# Patient Record
Sex: MALE | URBAN - METROPOLITAN AREA
[De-identification: names, ages, dates, MRNs, and addresses within clinical notes are randomized per-mention and may not be internally consistent; named-entity substitution may affect disease eponyms.]

---

## 2024-09-03 ENCOUNTER — TRANSCRIBE ORDERS (OUTPATIENT)
Dept: PSYCHIATRY | Facility: CLINIC | Age: 59
End: 2024-09-03

## 2024-09-03 DIAGNOSIS — F32.9 TREATMENT-RESISTANT DEPRESSION: Primary | ICD-10-CM

## 2024-09-12 ENCOUNTER — TRANSCRIBE ORDERS (OUTPATIENT)
Dept: PSYCHIATRY | Facility: CLINIC | Age: 59
End: 2024-09-12

## 2024-09-12 DIAGNOSIS — F33.1 MAJOR DEPRESSIVE DISORDER, RECURRENT EPISODE, MODERATE: Primary | ICD-10-CM
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
## 2024-10-21 ENCOUNTER — OFFICE VISIT (OUTPATIENT)
Dept: PSYCHIATRY | Facility: CLINIC | Age: 59
End: 2024-10-21
Payer: OTHER GOVERNMENT

## 2024-10-21 VITALS
HEART RATE: 79 BPM | WEIGHT: 182.6 LBS | OXYGEN SATURATION: 98 % | DIASTOLIC BLOOD PRESSURE: 63 MMHG | HEIGHT: 69 IN | BODY MASS INDEX: 27.05 KG/M2 | SYSTOLIC BLOOD PRESSURE: 92 MMHG

## 2024-10-21 DIAGNOSIS — Z79.899 ENCOUNTER FOR LONG-TERM (CURRENT) USE OF MEDICATIONS: ICD-10-CM

## 2024-10-21 DIAGNOSIS — F33.2 SEVERE EPISODE OF RECURRENT MAJOR DEPRESSIVE DISORDER, WITHOUT PSYCHOTIC FEATURES: Primary | Chronic | ICD-10-CM

## 2024-10-21 PROCEDURE — 90792 PSYCH DIAG EVAL W/MED SRVCS: CPT

## 2024-10-21 RX ORDER — SUMATRIPTAN 100 MG/1
100 TABLET, FILM COATED ORAL
COMMUNITY

## 2024-10-21 RX ORDER — BUSPIRONE HYDROCHLORIDE 7.5 MG/1
7.5 TABLET ORAL 3 TIMES DAILY
COMMUNITY

## 2024-10-21 RX ORDER — METOPROLOL TARTRATE 25 MG/1
25 TABLET, FILM COATED ORAL 2 TIMES DAILY
COMMUNITY

## 2024-10-21 RX ORDER — FLUNISOLIDE 0.25 MG/ML
2 SOLUTION NASAL EVERY 12 HOURS
COMMUNITY

## 2024-10-21 RX ORDER — ASPIRIN 81 MG/1
81 TABLET ORAL DAILY
COMMUNITY

## 2024-10-21 RX ORDER — SPIRONOLACTONE 25 MG/1
25 TABLET ORAL DAILY
COMMUNITY

## 2024-10-21 RX ORDER — BICTEGRAVIR SODIUM, EMTRICITABINE, AND TENOFOVIR ALAFENAMIDE FUMARATE 50; 200; 25 MG/1; MG/1; MG/1
TABLET ORAL DAILY
COMMUNITY

## 2024-10-21 RX ORDER — VENLAFAXINE 75 MG/1
225 TABLET ORAL DAILY
COMMUNITY

## 2024-10-21 RX ORDER — LORATADINE 10 MG/1
CAPSULE, LIQUID FILLED ORAL
COMMUNITY

## 2024-10-21 RX ORDER — POLYETHYLENE GLYCOL 3350 17 G
2 POWDER IN PACKET (EA) ORAL AS NEEDED
COMMUNITY

## 2024-10-21 RX ORDER — CHOLECALCIFEROL (VITAMIN D3) 10(400)/ML
DROPS ORAL
COMMUNITY

## 2024-10-21 RX ORDER — ALBUTEROL SULFATE 90 UG/1
2 INHALANT RESPIRATORY (INHALATION) EVERY 6 HOURS PRN
COMMUNITY

## 2024-10-21 RX ORDER — UREA 10 %
1000 LOTION (ML) TOPICAL DAILY
COMMUNITY

## 2024-10-21 RX ORDER — ROSUVASTATIN CALCIUM 20 MG/1
20 TABLET, COATED ORAL DAILY
COMMUNITY

## 2024-10-21 NOTE — PATIENT INSTRUCTIONS
Suicidal Feelings: How to Help Yourself  Suicide is when you end your own life. Suicidal ideation includes expressing thoughts about, or a preoccupation with, ending your own life. There are many things you can do to help yourself feel better when struggling with these feelings. Many services and people are available to support you and others who struggle with similar feelings.  If you ever feel like you may hurt yourself or others, or have thoughts about taking your own life, get help right away. To get help:  Go to your nearest emergency department.  Call your local emergency services (771 in the [place]  Call the [facility] and [facility] services helpline (211 in the [place]  Call or text a suicide hotline to speak with a trained counselor. The following suicide hotlines are available in the [place]:  5-354-741-TALK (1-829.900.8817 or 386 in the [place]  9-544-FGNQJRI (1-532.678.7785).  Text 928335. This is the Crisis Text Line in the [place]  1-535.952.6648. This is a hotline for Maltese speakers.  1-826.382.3515. This is a hotline for TTY users.  4-875-1-U-SYLVAIN (1-633.785.6447). This is a hotline for lesbian, lanier, bisexual, transgender, or questioning youth.  For a list of hotlines in [place], visit suicide.org/hotlines/international/jkxwzx-xmyortj-udwqogte.html  Contact a crisis center or a local suicide prevention center. To find a crisis center or suicide prevention center:  Call your local hospital, clinic, community service organization, mental health center, social service provider, or health department. Ask for help with connecting to a crisis center.  For a list of crisis centers in the [place], visit: suicidepreventionlifeline.org  For a list of crisis centers in [place], visit: suicideprevention.ca  How to help yourself feel better    Promise yourself that you will not do anything bad or extreme when you have suicidal feelings. Remember the times you have felt hopeful.  Many people have  gotten through suicidal thoughts and feelings, and you can too.  If you have had these feelings before, remind yourself that you can get through them again.  Let family, friends, teachers, or counselors know how you are feeling. Do not separate yourself from those who care about you and want to help you.  Talk with someone every day, even if you do not feel like talking to anyone or being with other people.  Face-to-face conversation is best to help them understand your feelings.  Contact a mental health care provider and work with this person regularly.  Make a safety plan that you can follow during a crisis.  Include phone numbers of suicide prevention hotlines, mental health professionals, and trusted friends and family members you can call during an emergency.  Save these numbers on your phone.  If you are thinking of taking a lot of medicine, give your medicine to someone who can give it to you as prescribed.  If you are on antidepressants and are concerned you will overdose, tell your health care provider so that he or she can give you safer medicines.  Try to stick to your routines and follow a schedule every day. Make self-care a priority.  Make a list of realistic goals, and cross them off when you achieve them. Accomplishments can give you a sense of worth.  Wait until you are feeling better before doing things that you find difficult or unpleasant.  Do things that you have always enjoyed to take your mind off your feelings.  Try reading a book, or listening to or playing music.  Spending time outside, in nature, may help you feel better.  Follow these instructions at home:    Visit your primary health care provider every year for a physical and a mental health checkup.  Take over-the-counter and prescription medicines only as told by your health care provider.  Ask your health care provider about the possible side effects of any medicines you are taking.  Ask your health care provider about whether  suicidal ideation is a possible side effect of any of your medicines.  Learn about suicidal ideation and what increases the risk for the development of suicidal thoughts.  Eat a well-balanced diet, and eat regular meals.  Get plenty of rest.  Exercise if you are able. Just 30 minutes of exercise each day can help you feel better.  Keep your living space well lit.  Do not use alcohol or drugs. Remove these substances from your home.  General recommendations  Remove weapons, poisons, knives, and other deadly items from your home.  Work with a mental health care provider as needed.  When you are feeling well, write yourself a letter with tips and support that you can read when you are not feeling well.  Remember that life's difficulties can be sorted out with help. Conditions can be treated, and you can learn behaviors and ways of thinking that will help you.  Work with your health care provider or counselor to learn ways of coping with your thoughts and feelings.  Where to find more information  National Suicide Prevention Lifeline: www.suicidepreventionlifeline.org  Hopeline: www.hopeline.[facility]  American Foundation for Suicide Prevention: www.afsp.org  The [place]ya Project (for lesbian, lanier, bisexual, transgender, or questioning youth): www.thetrevorproject.org  National [place] of Mental Health: www.nimh.nih.gov/health/topics/suicide-prevention  Suicide Prevention Resources: afsp.org/suicide-prevention-resources  Contact a health care provider if:  You feel as though you are a burden to others.  You feel agitated, angry, vengeful, or have extreme mood swings.  You have withdrawn from family and friends.  You are frequently using drugs or alcohol.  Get help right away if:  You are talking about suicide or wishing to die.  You start making plans for how to commit suicide.  You feel that you have no reason to live.  You start making plans for putting your affairs in order, saying goodbye, or giving your possessions  away.  You feel guilt, shame, or unbearable pain, and it seems like there is no way out.  You are engaging in risky behaviors that could lead to death.  If you have any of these thoughts or symptoms, get help right away:  Go to your nearest emergency department or crisis center.  Call emergency services (911 in the [place]  Call or text a suicide crisis helpline.  Summary  Suicide is when you take your own life. Suicidal feelings are thoughts about ending your own life.  Promise yourself that you will not do anything bad or extreme when you have suicidal feelings.  Let family, friends, teachers, or counselors know how you are feeling.  Get help right away if you start making plans for how to commit suicide.  This information is not intended to replace advice given to you by your health care provider. Make sure you discuss any questions you have with your health care provider.  Document Revised: 07/14/2022 Document Reviewed: 04/28/2022  [employer] Patient Education © 2024 [employer]    Major Depressive Disorder, Adult  Major depressive disorder (MDD) is a mental health condition. It may also be called clinical depression or unipolar depression. MDD causes symptoms of sadness, hopelessness, and loss of interest in things. These symptoms last most of the day, almost every day, for 2 weeks. MDD can also cause physical symptoms. It can interfere with relationships and activities, such as work, school, and activities that are usually pleasant.  MDD may be mild, moderate, or severe. It may be single-episode MDD, which happens once, or recurrent MDD, which may occur many times.  What are the causes?  The exact cause of this condition is not known.  What increases the risk?  The following factors may make someone more likely to develop MDD:  A family history of depression.  Being female.  Long-term (chronic) stress, physical illness, other mental health disorders, or substance misuse.  Trauma, including:  Family  problems.  Violence or abuse.  Loss of a parent or close family member.  Experiencing discrimination.  What are the signs or symptoms?  The main symptoms of MDD usually include:  Constant depressed or irritable mood.  A loss of interest in activities.  Sleeping or eating too much or too little.  Tiredness or low energy.  Other symptoms include:  Unexplained weight gain or weight loss.  Being agitated, restless, or weak.  Feeling hopeless, worthless, or guilty.  Trouble thinking clearly or making decisions.  Thoughts of suicide or harming others.  Spending a lot of time alone.  Not being able to complete daily tasks or work.  Severe symptoms of this condition may include:  Psychotic depression.This may include false beliefs or delusions. It may also include seeing, hearing, tasting, smelling, or feeling things that are not real (hallucinations).  Chronic depression or persistent depressive disorder. This is low-level depression that lasts for at least 2 years.  Melancholic depression, or feeling extremely sad and hopeless.  Catatonic depression, which includes trouble speaking and trouble moving.  Seasonal depression, which is caused by changes in the seasons.  How is this diagnosed?  This condition may be diagnosed based on:  Your symptoms.  Your medical and mental health history.  A physical exam.  Blood tests to rule out other conditions.  MDD is confirmed if you have either a depressed mood or loss of interest and at least four other MDD symptoms, most of the day, nearly every day, in a 2-week period.  How is this treated?  This condition is usually treated by mental health professionals, such as psychologists, psychiatrists, and clinical social workers. You may need more than one type of treatment. Treatment may include:  Psychotherapy, also called talk therapy or counseling. Types of psychotherapy include:  Cognitive behavioral therapy (CBT). This teaches you to recognize unhealthy feelings, thoughts, and  behaviors, and replace them with positive thoughts and actions.  Interpersonal therapy (IPT). This helps you to improve the way you communicate with others or relate to them.  Family therapy. This treatment includes members of your family.  Medicines to treat anxiety and depression. These medicines help to balance the brain chemicals that affect your emotions.  Lifestyle changes. You may be asked to:  Limit alcohol use and avoid drug use.  Get regular exercise.  Get plenty of sleep.  Make healthy eating choices.  Spend more time outdoors.  Brain stimulation. This may be done if symptoms are very severe and other treatments have not worked. Examples of this treatment are electroconvulsive therapy and transcranial magnetic stimulation.  Follow these instructions at home:  Alcohol use  Do not drink alcohol if:  Your health care provider tells you not to drink.  You are pregnant, may be pregnant, or are planning to become pregnant.  If you drink alcohol:  Limit how much you have to:  0-1 drink a day for women  0-2 drinks a day for men.  Know how much alcohol is in your drink. In the [place] one drink equals one 12 oz bottle of beer (355 mL), one 5 oz glass of wine (148 mL), or one 1½ oz glass of hard liquor (44 mL).  Activity  Exercise regularly and spend time outdoors.  Find activities that you enjoy and make time to do them.  Find healthy ways to manage stress, such as:  Meditation or deep breathing.  Spending time in nature.  Journaling.  Return to your normal activities as told by your health care provider. Ask your health care provider what activities are safe for you.  General instructions    Take over-the-counter and prescription medicines only as told by your health care provider.  Discuss alcohol use with your health care provider. Alcohol can affect any antidepressant medicines you are taking.  Discuss any drug use with your health care provider.  Eat a healthy diet and get enough sleep.  Consider joining a  support group. Your health care provider may be able to recommend one.  Keep all follow-up visits. It is important for your health care provider to check on your mood, behavior, and medicines. Your health care provider will make changes to your treatment as needed.  Where to find more information  National [place] on Mental Illness: doris.org  National [place] of Mental Health: nimh.nih.gov  American Psychiatric Association: psychiatry.org  Contact a health care provider if:  Your symptoms get worse.  You develop new symptoms.  Get help right away if:  You hurt yourself on purpose (self-harm).  You have thoughts about hurting yourself or others.  You have hallucinations.  Get help right away if you feel like you may hurt yourself or others, or have thoughts about taking your own life. Go to your nearest emergency room or:  Call 911.  Call the National Suicide Prevention Lifeline at 1-652.161.2367 or 838. This is open 24 hours a day.  Text the Crisis Text Line at 734272.  This information is not intended to replace advice given to you by your health care provider. Make sure you discuss any questions you have with your health care provider.  Document Revised: 04/25/2023 Document Reviewed: 04/25/2023  [employer]vier Patient Education © 2024 [employer]

## 2024-10-23 ENCOUNTER — PATIENT ROUNDING (BHMG ONLY) (OUTPATIENT)
Dept: PSYCHIATRY | Facility: CLINIC | Age: 59
End: 2024-10-23
Payer: OTHER GOVERNMENT

## 2024-10-23 NOTE — PROGRESS NOTES
A My-chart message has been sent to the patient for PATIENT ROUNDING with [facility].

## 2024-10-24 LAB
1OH-MIDAZOLAM UR QL SCN: NOT DETECTED NG/MG CREAT
6MAM UR QL SCN: NEGATIVE NG/ML
7AMINOCLONAZEPAM/CREAT UR: NOT DETECTED NG/MG CREAT
8OH-AMOXAPINE UR QL: NOT DETECTED
8OH-LOXAPINE UR QL SCN: NOT DETECTED
A-OH ALPRAZ/CREAT UR: NOT DETECTED NG/MG CREAT
A-OH-TRIAZOLAM/CREAT UR CFM: NOT DETECTED NG/MG CREAT
ALPRAZ/CREAT UR CFM: NOT DETECTED NG/MG CREAT
AMITRIP UR-MCNC: NOT DETECTED NG/ML
AMOXAPINE UR QL: NOT DETECTED
AMPHET UR CFM-MCNC: NOT DETECTED NG/MG CREAT
AMPHETAMINES UR QL SCN>500 NG/ML: NORMAL NG/ML
AMPHETAMINES UR QL: NEGATIVE
ANTICONVULSANTS UR: NEGATIVE
ANTIPSYCHOTICS UR: NEGATIVE
ARIPIPRAZOLE UR QL SCN: NOT DETECTED
ASENAPINE UR QL CFM: NOT DETECTED
BARBITURATES UR QL SCN: NEGATIVE NG/ML
BENZODIAZ SCN METH UR: NEGATIVE
BUPRENORPHINE UR QL SCN: NEGATIVE NG/ML
BUPROPION UR QL: NOT DETECTED
CANNABINOIDS UR QL CFM: NORMAL
CANNABINOIDS UR QL SCN: NORMAL NG/ML
CARBOXYTHC UR CFM-MCNC: 95 NG/MG CREAT
CARISOPRODOL UR QL: NEGATIVE NG/ML
CHLORPROMAZINE UR QL: NOT DETECTED
CITALOPRAM, UR: NOT DETECTED
CLOMIPRAMINE UR-MCNC: NOT DETECTED NG/ML
CLONAZEPAM/CREAT UR CFM: NOT DETECTED NG/MG CREAT
CLOZAPINE UR QL: NOT DETECTED
COCAINE+BZE UR QL SCN: NEGATIVE NG/ML
CREAT UR-MCNC: 109 MG/DL
DESALKYLFLURAZ/CREAT UR: NOT DETECTED NG/MG CREAT
DESIPRAMINE UR-MCNC: NOT DETECTED NG/ML
DIAZEPAM/CREAT UR: NOT DETECTED NG/MG CREAT
DOXEPIN UR-MCNC: NOT DETECTED NG/ML
DULOXETINE UR QL: NOT DETECTED
ETHANOL UR QL SCN: NEGATIVE NG/ML
FENTANYL UR QL SCN: NEGATIVE NG/ML
FLUNITRAZEPAM UR QL SCN: NOT DETECTED NG/MG CREAT
FLUOXETINE UR QL SCN: NOT DETECTED
FLUPHENAZINE UR-MCNC: NOT DETECTED NG/ML
FLUVOXAMINE UR QL: NOT DETECTED
GABAPENTIN UR-MCNC: NEGATIVE UG/ML
HALOPERIDOL UR QL: NOT DETECTED
ILOPERIDONE UR QL CFM: NOT DETECTED
IMIPRAMINE UR-MCNC: NOT DETECTED NG/ML
KRATOM IA, UR: NEGATIVE NG/ML
LORAZEPAM/CREAT UR: NOT DETECTED NG/MG CREAT
LOXAPINE UR QL: NOT DETECTED
LURASIDONE UR QL CFM: NOT DETECTED
MAPROTILINE UR QL: NOT DETECTED
MDA UR CFM-MCNC: NOT DETECTED NG/MG CREAT
MDMA UR CFM-MCNC: NOT DETECTED NG/MG CREAT
ME-PHENIDATE UR QL SCN: NEGATIVE NG/ML
MESORIDAZINE UR QL: NOT DETECTED
METHADONE UR QL SCN: NEGATIVE NG/ML
METHADONE+METAB UR QL SCN: NEGATIVE NG/ML
METHAMPHET UR CFM-MCNC: NOT DETECTED NG/MG CREAT
MIDAZOLAM/CREAT UR CFM: NOT DETECTED NG/MG CREAT
MIRTAZAPINE UR-MCNC: NOT DETECTED UG/ML
MOLINDONE UR QL SCN: NOT DETECTED
NEFAZODONE UR QL: NOT DETECTED
NORCITALOPRAM UR QL: NOT DETECTED
NORCLOMIPRAMINE UR QL: NOT DETECTED
NORCLOZAPINE UR QL: NOT DETECTED
NORDIAZEPAM/CREAT UR: NOT DETECTED NG/MG CREAT
NORDOXEPIN UR QL: NOT DETECTED
NORFLUNITRAZEPAM UR-MCNC: NOT DETECTED NG/MG CREAT
NORFLUOXETINE UR-MCNC: NOT DETECTED NG/ML
NORSERTRALINE UR QL: NOT DETECTED
NORTRIP UR-MCNC: NOT DETECTED NG/ML
ODV UR-MCNC: PRESENT NG/ML
OH-BUPROPION UR-MCNC: NOT DETECTED NG/ML
OLANZAPINE UR CFM-MCNC: NOT DETECTED NG/ML
OPIATES UR SCN-MCNC: NEGATIVE NG/ML
OXAZEPAM/CREAT UR: NOT DETECTED NG/MG CREAT
OXYCODONE CTO UR SCN-MCNC: NEGATIVE NG/ML
PAROXETINE UR-MCNC: NOT DETECTED NG/L
PCP UR QL SCN: NEGATIVE NG/ML
PERPHENAZINE UR QL: NOT DETECTED
PIMOZIDE, UR: NOT DETECTED
PREGABALIN UR QL SCN: NOT DETECTED
PRESCRIBED MEDICATIONS: NORMAL
PROCHLORPERAZINE UR QL: NOT DETECTED
PROPOXYPH UR QL SCN: NEGATIVE NG/ML
PROTRIP UR QL: NOT DETECTED
QUETIAPINE CTO UR CFM-MCNC: NOT DETECTED NG/ML
RISPERIDONE UR QL: NOT DETECTED
SERTRALINE UR-MCNC: NOT DETECTED NG/ML
TAPENTADOL CTO UR SCN-MCNC: NEGATIVE NG/ML
TEMAZEPAM/CREAT UR: NOT DETECTED NG/MG CREAT
THIORIDAZINE UR-MCNC: NOT DETECTED UG/ML
THIOTHIXENE UR QL: NOT DETECTED
TRAMADOL UR QL SCN: NEGATIVE NG/ML
TRAZODONE UR QL: PRESENT
TRAZODONE UR-MCNC: PRESENT UG/ML
TRICYCLICS TESTED UR SCN: NORMAL
TRIFPERAZINE UR QL: NOT DETECTED
TRIMIPRAMINE UR QL: NOT DETECTED
VENLAFAXINE UR QL: PRESENT
VILAZ UR QL SCN: NOT DETECTED
ZIPRASIDONE UR QL SCN: NOT DETECTED

## 2024-10-29 ENCOUNTER — TELEPHONE (OUTPATIENT)
Dept: PSYCHIATRY | Facility: CLINIC | Age: 59
End: 2024-10-29
Payer: OTHER GOVERNMENT

## 2024-10-29 NOTE — TELEPHONE ENCOUNTER
The patient has [facility] from [facility] for visits and treatments. He will be a buy and bill, the medication will come from our distributor.  We will need to use CPT code  for Spravato 56 mg and  for Spravato 84 mg. We can schedule. we do have the medication on hand.  Please advise if we should schedule.

## 2024-10-31 ENCOUNTER — TELEPHONE (OUTPATIENT)
Dept: PSYCHIATRY | Facility: CLINIC | Age: 59
End: 2024-10-31
Payer: OTHER GOVERNMENT

## 2024-11-03 NOTE — PROGRESS NOTES
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     MEDS ORDERED DURING VISIT:  No orders of the defined types were placed in this encounter.      No follow-ups on file.         This document has been electronically signed by LOBO Vance  November 4, 2024 08:53 EST    Part of this note may be an electronic transcription/translation of spoken language to printed text using the Dragon Dictation System. Some of the data in this electronic note has been brought forward from a previous encounter, any necessary changes have been made, it has been reviewed by this APRN, and it is accurate.

## 2024-11-04 ENCOUNTER — OFFICE VISIT (OUTPATIENT)
Dept: PSYCHIATRY | Facility: CLINIC | Age: 59
End: 2024-11-04
Payer: OTHER GOVERNMENT

## 2024-11-04 DIAGNOSIS — F33.2 SEVERE EPISODE OF RECURRENT MAJOR DEPRESSIVE DISORDER, WITHOUT PSYCHOTIC FEATURES: Primary | Chronic | ICD-10-CM

## 2024-11-04 RX ORDER — BUPROPION HYDROCHLORIDE 150 MG/1
1 TABLET, FILM COATED, EXTENDED RELEASE ORAL DAILY
COMMUNITY

## 2024-11-04 NOTE — PROGRESS NOTES
Spravato Monitoring Note    Start time of observation: 809 am  BP check prior to administration: 121/70  O2-98    Rajendra Haydenreuben   presented 11/04/2024  for clinical monitoring of self administration of Spravato. The patient used a total of 2 devices, self-administered intranasally, with a 5-minute rest between each device.    Each device was checked by  for appropriate expiration and that 2 green indicator dots were present prior to patient administration. Afterwards, the device was checked by  to ensure the green indicator dots were gone and the full medication was delivered.    Patient check at 849 am with BP reading of 124/77  O2-99  Patient check at 1009 am with BP reading of 138/83  O2-98    End time of observation: 1009 am    Patient monitored for 2 hours Patient tolerated the procedure well without complications..       NDC [id number]  LOT [id number]  EXP 11/01/2026

## 2024-11-06 NOTE — PROGRESS NOTES
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 regimen & F/U appt's and deny impairment in daily functioning over the next 6 months.      CRISIS RESOURCES:    In the event you have personal crisis, there are several resources to reach someone to talk with:    988 Suicide and Crisis Lifeline  Call or text 988 or chat 988ESTmobline.org  [facility]'s National Helpline  8-545-035-HELP (4357)  Text your zip code to 643461 (HELP4U)  [facility] Crisis Line  Dial 988, then press 1  Text 641665    No show policy:  We understand unexpected circumstances arise; however, anytime you miss your appointment we are unable to provide you appropriate care.  In addition, each appointment missed could have been used to provide care for others.  We ask that you call at least 24 hours in advance to cancel or reschedule an appointment. We would like to take this opportunity to remind you of our policy stating patients who miss THREE appointments without cancelling or rescheduling 24 hours in advance of the appointment may be subject to cancellation of any further visits with our clinic. Please call  [phone] to reschedule your appointment. If there are reasons that make it difficult for you to keep the appointments, please call and let us know how we can help. Please understand that medication prescribing will not continue without seeing your provider.        MEDICATION ISSUES:  INSPECT reviewed as expected    Discussed medication options and treatment plan of prescribed medication as well as the risks, benefits, and side effects including potential falls, possible impaired driving and metabolic adversities among others. Patient is agreeable to call the office with any worsening of symptoms or onset of side effects. Patient is agreeable to call 911 or go to the nearest ER should he/she begin having SI/HI. No medication side effects or related complaints today.     MEDS ORDERED DURING VISIT:  No orders of the defined types were placed in this encounter.      No follow-ups on file.         This document has been electronically signed by LOBO Vance  November 7, 2024 10:01 EST    Part of this note may be an electronic transcription/translation of spoken language to printed text using the Dragon Dictation System. Some of the data in this electronic note has been brought forward from a previous encounter, any necessary changes have been made, it has been reviewed by this APRN, and it is accurate.

## 2024-11-07 ENCOUNTER — OFFICE VISIT (OUTPATIENT)
Dept: PSYCHIATRY | Facility: CLINIC | Age: 59
End: 2024-11-07
Payer: OTHER GOVERNMENT

## 2024-11-07 DIAGNOSIS — F33.2 SEVERE EPISODE OF RECURRENT MAJOR DEPRESSIVE DISORDER, WITHOUT PSYCHOTIC FEATURES: Primary | Chronic | ICD-10-CM

## 2024-11-07 NOTE — PROGRESS NOTES
Spravato Monitoring Note    Start time of observation: 802 am  BP check prior to administration: 106/69  O2-99    Rajendra Haydenreuben   presented 11/07/2024  for clinical monitoring of self administration of Spravato. The patient used a total of 2 devices, self-administered intranasally, with a 5-minute rest between each device.    Each device was checked by [facility] for appropriate expiration and that 2 green indicator dots were present prior to patient administration. Afterwards, the device was checked by [facility] to ensure the green indicator dots were gone and the full medication was delivered.    Patient check at 842 am with BP reading of 119/77  O2-99  Patient check at 1002 am with BP reading of 117/73  O2-99    End time of observation: 1002 am    Patient monitored for 2 hours Patient tolerated the procedure well without complications..       NDC 02575-890-28  LOT [id number]  EXP 11/01/2026

## 2024-11-12 ENCOUNTER — OFFICE VISIT (OUTPATIENT)
Dept: PSYCHIATRY | Facility: CLINIC | Age: 59
End: 2024-11-12
Payer: OTHER GOVERNMENT

## 2024-11-12 DIAGNOSIS — F33.2 SEVERE EPISODE OF RECURRENT MAJOR DEPRESSIVE DISORDER, WITHOUT PSYCHOTIC FEATURES: Primary | Chronic | ICD-10-CM

## 2024-11-12 NOTE — PROGRESS NOTES
Spravato Monitoring Note    Start time of observation: 804 am  BP check prior to administration: 107/71  O2-99    Rajendra Haydenreuben   presented 11/12/2024  for clinical monitoring of self administration of Spravato. The patient used a total of 3 devices, self-administered intranasally, with a 5-minute rest between each device.    Each device was checked by [facility] for appropriate expiration and that 2 green indicator dots were present prior to patient administration. Afterwards, the device was checked by [facility] to ensure the green indicator dots were gone and the full medication was delivered.    Patient check at 844 am with BP reading of 118/75  O2-98  Patient check at 1004 am with BP reading of 118/78  O2-97    End time of observation: 1004 am    Patient monitored for 2 hours Patient tolerated the procedure well without complications..       NDC 94855-201-60  LOT [id number]  EXP 4/1/2027

## 2024-11-12 NOTE — PROGRESS NOTES
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 consented.     TREATMENT PLAN/GOALS: Continue supportive psychotherapy efforts and medications as indicated. Treatment and medication options discussed during today's visit. Patient ackowledged and verbally consented to continue with current treatment plan and was educated on the importance of compliance with treatment and follow-up appointments.    -Short-Term Goals: Patient will be compliant with medication management and note improvement in S/S over the next 4 to 6 weeks or at next scheduled visit.  -Long-Term Goals: Patient will be compliant with the agreed treatment plan including medication regimen & F/U appt's and deny impairment in daily functioning over the next 6 months.      CRISIS RESOURCES:    In the event you have personal crisis, there are several resources to reach someone to talk with:    988 Suicide and Crisis Lifeline  Call or text 917 or chat 988lifeline.org  [facility]'s National Helpline  3-330-954-HELP (6456)  Text your zip code to 668586 (HELP4U)  [place]'s Crisis Line  Dial 988, then press 1  Text 066108    No show  policy:  We understand unexpected circumstances arise; however, anytime you miss your appointment we are unable to provide you appropriate care.  In addition, each appointment missed could have been used to provide care for others.  We ask that you call at least 24 hours in advance to cancel or reschedule an appointment. We would like to take this opportunity to remind you of our policy stating patients who miss THREE appointments without cancelling or rescheduling 24 hours in advance of the appointment may be subject to cancellation of any further visits with our clinic. Please call [phone] to reschedule your appointment. If there are reasons that make it difficult for you to keep the appointments, please call and let us know how we can help. Please understand that medication prescribing will not continue without seeing your provider.        MEDICATION ISSUES:  INSPECT reviewed as expected    Discussed medication options and treatment plan of prescribed medication as well as the risks, benefits, and side effects including potential falls, possible impaired driving and metabolic adversities among others. Patient is agreeable to call the office with any worsening of symptoms or onset of side effects. Patient is agreeable to call 911 or go to the nearest ER should he/she begin having SI/HI. No medication side effects or related complaints today.     MEDS ORDERED DURING VISIT:  New Medications Ordered This Visit   Medications    Esketamine HCl (84 MG Dose) Nasal Solution 84 mg       No follow-ups on file.         This document has been electronically signed by LOBO Vance  November 12, 2024 15:59 EST    Part of this note may be an electronic transcription/translation of spoken language to printed text using the Dragon Dictation System. Some of the data in this electronic note has been brought forward from a previous encounter, any necessary changes have been made, it has been reviewed by this APRN,  and it is accurate.

## 2024-11-13 NOTE — PROGRESS NOTES
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 the medication. As of the time of this note, she had not been able to reach him by phone.     Visit Diagnoses:    ICD-10-CM ICD-9-CM   1. Severe episode of recurrent major depressive disorder, without psychotic features  F33.2 296.33       Pt history, review of systems, medications, allergies, reviewed, patient was screened today for depression/anxiety, PHQ/SIXTO scores reviewed.  Most recent vitals/labs reviewed.  Pt was given appropriate time to ask questions and concerns were addressed. A thorough discussion was had that included review of disease process, need for continued monitoring and additional treatment options including use of pharmacological and non-pharmacological approaches to care, decisions were made and agreed upon by patient and provider.   Discussed the risks, benefits, and potential side effects of the medications; patient ackowledged and verbally consented.     TREATMENT PLAN/GOALS: Continue supportive psychotherapy efforts and medications as indicated. Treatment and medication options discussed  during today's visit. Patient ackowledged and verbally consented to continue with current treatment plan and was educated on the importance of compliance with treatment and follow-up appointments.    -Short-Term Goals: Patient will be compliant with medication management and note improvement in S/S over the next 4 to 6 weeks or at next scheduled visit.  -Long-Term Goals: Patient will be compliant with the agreed treatment plan including medication regimen & F/U appt's and deny impairment in daily functioning over the next 6 months.      CRISIS RESOURCES:    In the event you have personal crisis, there are several resources to reach someone to talk with:    948 Suicide and Crisis Lifeline  Call or text 239 or chat 98[facility]agaraline[facility]  [facility]'s National Helpline  0-223-169-HELP (4357)  Text your zip code to 018367 (HELP4U)  [place]'s Crisis Line  Dial 738, then press 1  Text 305525    No show policy:  We understand unexpected circumstances arise; however, anytime you miss your appointment we are unable to provide you appropriate care.  In addition, each appointment missed could have been used to provide care for others.  We ask that you call at least 24 hours in advance to cancel or reschedule an appointment. We would like to take this opportunity to remind you of our policy stating patients who miss THREE appointments without cancelling or rescheduling 24 hours in advance of the appointment may be subject to cancellation of any further visits with our clinic. Please call [phone] to reschedule your appointment. If there are reasons that make it difficult for you to keep the appointments, please call and let us know how we can help. Please understand that medication prescribing will not continue without seeing your provider.        MEDICATION ISSUES:  INSPECT reviewed as expected    Discussed medication options and treatment plan of prescribed medication as well as the risks, benefits, and side effects including  potential falls, possible impaired driving and metabolic adversities among others. Patient is agreeable to call the office with any worsening of symptoms or onset of side effects. Patient is agreeable to call 911 or go to the nearest ER should he/she begin having SI/HI. No medication side effects or related complaints today.     MEDS ORDERED DURING VISIT:  No orders of the defined types were placed in this encounter.      No follow-ups on file.         This document has been electronically signed by LOBO Vance  November 14, 2024 11:45 EST    Part of this note may be an electronic transcription/translation of spoken language to printed text using the Dragon Dictation System. Some of the data in this electronic note has been brought forward from a previous encounter, any necessary changes have been made, it has been reviewed by this APRN, and it is accurate.

## 2024-11-14 ENCOUNTER — OFFICE VISIT (OUTPATIENT)
Dept: PSYCHIATRY | Facility: CLINIC | Age: 59
End: 2024-11-14
Payer: OTHER GOVERNMENT

## 2024-11-14 ENCOUNTER — TELEPHONE (OUTPATIENT)
Dept: PSYCHIATRY | Facility: CLINIC | Age: 59
End: 2024-11-14

## 2024-11-14 DIAGNOSIS — F33.2 SEVERE EPISODE OF RECURRENT MAJOR DEPRESSIVE DISORDER, WITHOUT PSYCHOTIC FEATURES: Primary | Chronic | ICD-10-CM

## 2024-11-14 NOTE — TELEPHONE ENCOUNTER
The patient presented to our office today for his fourth Spravato treatment. The patient is informed of the Spravato required steps that must be followed before, during and after each Spravato treatment at each appointment. He voices understanding. Today the medical assistant providing care for him notified me that he left the office after his treatment without staff.   I contacted the patient to discuss the Spravato Rems requirements. I explained he must notify the staff for assistance if he needs to go to the restroom and he must wait in our office for a staff to accompany him to his transportation. I explained we are [employer] certified and these steps are mandatory and if we do not abide by these rules, we would lose our certification and would not be able to provide Spravato to any patients. He again voiced understanding stated he will comply with the Spravato rems requirements and apologized.

## 2024-11-14 NOTE — PROGRESS NOTES
Spravato Monitoring Note    Start time of observation: 759 am  BP check prior to administration: 120/82  O2-99    Rajendra Hernandez   presented 11/14/2024  for clinical monitoring of self administration of Spravato. The patient used a total of 3 devices, self-administered intranasally, with a 5-minute rest between each device.    Each device was checked by [facility] for appropriate expiration and that 2 green indicator dots were present prior to patient administration. Afterwards, the device was checked by [facility] to ensure the green indicator dots were gone and the full medication was delivered.    Patient check at 839 am with BP reading of 132/82  O2-99  Patient check at 959 am with BP reading of 112/81  O2-100    End time of observation: 959 am    Patient monitored for 2 hours Patient tolerated the procedure well without complications..       NDC 41336-925-91   LOT [id number]  EXP 04/01/2027        Patient got up and went to restroom with out calling someone to take him. The office and [employer]S policy was explained again.  He then did not tell anyone that his ride was here and he left the waiting room on his own to go to ride.  Provider and  notified.

## 2024-11-19 NOTE — PROGRESS NOTES
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 medications, allergies, reviewed, patient was screened today for depression/anxiety, PHQ/SIXTO scores reviewed.  Most recent vitals/labs reviewed.  Pt was given appropriate time to ask questions and concerns were addressed. A thorough discussion was had that included review of disease process, need for continued monitoring and additional treatment options including use of pharmacological and non-pharmacological approaches to care, decisions were made and agreed upon by patient and provider.   Discussed the risks, benefits, and potential side effects of the medications; patient ackowledged and verbally consented.     TREATMENT PLAN/GOALS: Continue supportive psychotherapy efforts and medications as indicated. Treatment and medication options discussed during today's visit. Patient ackowledged and verbally consented to continue with current treatment plan and was educated on the importance of compliance with treatment and follow-up appointments.    -Short-Term Goals: Patient will be compliant with medication management and note improvement in S/S over the next 4 to 6 weeks or at next  scheduled visit.  -Long-Term Goals: Patient will be compliant with the agreed treatment plan including medication regimen & F/U appt's and deny impairment in daily functioning over the next 6 months.      CRISIS RESOURCES:    In the event you have personal crisis, there are several resources to reach someone to talk with:    012 Suicide and Crisis Lifeline  Call or text 984 or chat [employer]81Science [facility]  [facility]'s National Helpline  6-704-106-HELP (4357)  Text your zip code to 766057 (HELP4U)  [facility] Crisis Line  Dial 048, then press 1  Text 251291    No show policy:  We understand unexpected circumstances arise; however, anytime you miss your appointment we are unable to provide you appropriate care.  In addition, each appointment missed could have been used to provide care for others.  We ask that you call at least 24 hours in advance to cancel or reschedule an appointment. We would like to take this opportunity to remind you of our policy stating patients who miss THREE appointments without cancelling or rescheduling 24 hours in advance of the appointment may be subject to cancellation of any further visits with our clinic. Please call [phone] to reschedule your appointment. If there are reasons that make it difficult for you to keep the appointments, please call and let us know how we can help. Please understand that medication prescribing will not continue without seeing your provider.        MEDICATION ISSUES:  INSPECT reviewed as expected    Discussed medication options and treatment plan of prescribed medication as well as the risks, benefits, and side effects including potential falls, possible impaired driving and metabolic adversities among others. Patient is agreeable to call the office with any worsening of symptoms or onset of side effects. Patient is agreeable to call 911 or go to the nearest ER should he/she begin having SI/HI. No medication side effects or related complaints today.     MEDS ORDERED  DURING VISIT:  No orders of the defined types were placed in this encounter.      No follow-ups on file.         This document has been electronically signed by LOBO Vance  November 20, 2024 17:06 EST    Part of this note may be an electronic transcription/translation of spoken language to printed text using the Dragon Dictation System. Some of the data in this electronic note has been brought forward from a previous encounter, any necessary changes have been made, it has been reviewed by this APRN, and it is accurate.

## 2024-11-20 ENCOUNTER — OFFICE VISIT (OUTPATIENT)
Dept: PSYCHIATRY | Facility: CLINIC | Age: 59
End: 2024-11-20
Payer: OTHER GOVERNMENT

## 2024-11-20 DIAGNOSIS — F33.2 SEVERE EPISODE OF RECURRENT MAJOR DEPRESSIVE DISORDER, WITHOUT PSYCHOTIC FEATURES: Primary | Chronic | ICD-10-CM

## 2024-11-20 NOTE — PROGRESS NOTES
Spravato Monitoring Note    Start time of observation: 804   BP check prior to administration: 116/68  O2-97    Rajendra David   presented 11/20/2024  for clinical monitoring of self administration of Spravato. The patient used a total of 3 devices, self-administered intranasally, with a 5-minute rest between each device.    Each device was checked by [facility] for appropriate expiration and that 2 green indicator dots were present prior to patient administration. Afterwards, the device was checked by [facility] to ensure the green indicator dots were gone and the full medication was delivered.    Patient check at 844 with BP reading of 131/82  O2-99  Patient check at 1004 with BP reading of 127/86  O2-98    End time of observation: 1004    Patient monitored for 2 hours Patient tolerated the procedure well without complications..       NDC 353714-684-98  LOT [id number]  EXP 04/01/2027

## 2024-11-21 ENCOUNTER — OFFICE VISIT (OUTPATIENT)
Dept: PSYCHIATRY | Facility: CLINIC | Age: 59
End: 2024-11-21
Payer: OTHER GOVERNMENT

## 2024-11-21 DIAGNOSIS — F33.2 SEVERE EPISODE OF RECURRENT MAJOR DEPRESSIVE DISORDER, WITHOUT PSYCHOTIC FEATURES: Primary | Chronic | ICD-10-CM

## 2024-11-21 NOTE — PROGRESS NOTES
Spravato Monitoring Note    Start time of observation: 907  BP check prior to administration: 115/67  O2-99    Rajendra Haydenreuben   presented 11/21/2024  for clinical monitoring of self administration of Spravato. The patient used a total of 3 devices, self-administered intranasally, with a 5-minute rest between each device.    Each device was checked by [facility] for appropriate expiration and that 2 green indicator dots were present prior to patient administration. Afterwards, the device was checked by [facility] to ensure the green indicator dots were gone and the full medication was delivered.    Patient check at 947 with BP reading of 124/79  O2-98  Patient check at 1107 with BP reading of 124/75  O2-100    End time of observation: 1107    Patient monitored for 2 hours Patient tolerated the procedure well without complications..       NDC 67111-339-40   LOT [id number]  EXP 04/01/2027

## 2024-11-21 NOTE — PROGRESS NOTES
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    ICD-10-CM ICD-9-CM   1. Severe episode of recurrent major depressive disorder, without psychotic features  F33.2 296.33           Pt history, review of systems, medications, allergies, reviewed, patient was screened today for depression/anxiety, PHQ/SIXTO scores reviewed.  Most recent vitals/labs reviewed.  Pt was given appropriate time to ask questions and concerns were addressed. A thorough discussion was had that included review of disease process, need for continued monitoring and additional treatment options including use of pharmacological and non-pharmacological approaches to care, decisions were made and agreed upon by patient and provider.   Discussed the risks, benefits, and potential side effects of the medications; patient ackowledged and verbally consented.     TREATMENT PLAN/GOALS: Continue supportive psychotherapy efforts and medications as indicated. Treatment and medication options discussed during today's visit. Patient ackowledged and verbally consented to continue with current treatment plan and was educated on the importance of  compliance with treatment and follow-up appointments.    -Short-Term Goals: Patient will be compliant with medication management and note improvement in S/S over the next 4 to 6 weeks or at next scheduled visit.  -Long-Term Goals: Patient will be compliant with the agreed treatment plan including medication regimen & F/U appt's and deny impairment in daily functioning over the next 6 months.      CRISIS RESOURCES:    In the event you have personal crisis, there are several resources to reach someone to talk with:    308 Suicide and Crisis Lifeline  Call or text 988 or chat 985Lezu365line.org  [facility]'s National Helpline  5-508-231-HELP (4357)  Text your zip code to 276411 (HELP4U)  [facility] Crisis Line  Dial 948, then press 1  Text 563275    No show policy:  We understand unexpected circumstances arise; however, anytime you miss your appointment we are unable to provide you appropriate care.  In addition, each appointment missed could have been used to provide care for others.  We ask that you call at least 24 hours in advance to cancel or reschedule an appointment. We would like to take this opportunity to remind you of our policy stating patients who miss THREE appointments without cancelling or rescheduling 24 hours in advance of the appointment may be subject to cancellation of any further visits with our clinic. Please call [phone] to reschedule your appointment. If there are reasons that make it difficult for you to keep the appointments, please call and let us know how we can help. Please understand that medication prescribing will not continue without seeing your provider.        MEDICATION ISSUES:  INSPECT reviewed as expected    Discussed medication options and treatment plan of prescribed medication as well as the risks, benefits, and side effects including potential falls, possible impaired driving and metabolic adversities among others. Patient is agreeable to call the office with any worsening of  symptoms or onset of side effects. Patient is agreeable to call 911 or go to the nearest ER should he/she begin having SI/HI. No medication side effects or related complaints today.     MEDS ORDERED DURING VISIT:  No orders of the defined types were placed in this encounter.      No follow-ups on file.         This document has been electronically signed by LOBO Vance  November 21, 2024 19:37 EST    Part of this note may be an electronic transcription/translation of spoken language to printed text using the Dragon Dictation System. Some of the data in this electronic note has been brought forward from a previous encounter, any necessary changes have been made, it has been reviewed by this APRN, and it is accurate.

## 2024-11-26 NOTE — PROGRESS NOTES
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 Final    MDMA-Ecstasy 10/21/2024 Not Detected  ng/mg creat Final    MDA 10/21/2024 Not Detected  ng/mg creat Final    Cannabinoid Confirmation 10/21/2024 +POSITIVE+   Final    Carboxy THC 10/21/2024 95  ng/mg creat Final    Comment: This test is not intended to distinguish between the metabolites of  delta-9-tetrahydrocannabinol, the predominant form of THC in most  herbal or marijuana-based products, and delta-8-tetrahydrocannabinol,  a psychoactive compound generally synthesized from other  cannabinoids.         Assessment & Plan   Diagnoses and all orders for this visit:    1. Severe episode of recurrent major depressive disorder, without psychotic features (Primary)        Continue Spravato 84mg twice weekly.     Patient will continue Effexor 75mg daily, buspirone 7.5mg daily, trazodone 100mg nightly, [facility].       Visit Diagnoses:    ICD-10-CM ICD-9-CM   1. Severe episode of recurrent major depressive disorder, without psychotic features  F33.2 296.33             Pt history, review of systems, medications, allergies, reviewed, patient was screened  today for depression/anxiety, PHQ/SIXTO scores reviewed.  Most recent vitals/labs reviewed.  Pt was given appropriate time to ask questions and concerns were addressed. A thorough discussion was had that included review of disease process, need for continued monitoring and additional treatment options including use of pharmacological and non-pharmacological approaches to care, decisions were made and agreed upon by patient and provider.   Discussed the risks, benefits, and potential side effects of the medications; patient ackowledged and verbally consented.     TREATMENT PLAN/GOALS: Continue supportive psychotherapy efforts and medications as indicated. Treatment and medication options discussed during today's visit. Patient ackowledged and verbally consented to continue with current treatment plan and was educated on the importance of compliance with treatment and follow-up appointments.    -Short-Term Goals: Patient will be compliant with medication management and note improvement in S/S over the next 4 to 6 weeks or at next scheduled visit.  -Long-Term Goals: Patient will be compliant with the agreed treatment plan including medication regimen & F/U appt's and deny impairment in daily functioning over the next 6 months.      CRISIS RESOURCES:    In the event you have personal crisis, there are several resources to reach someone to talk with:    988 Suicide and Crisis Lifeline  Call or text 988 or chat 988lifeline.org  [facility]'s National Helpline  6-615-418-HELP (4357)  Text your zip code to 530250 (HELP4U)  's Crisis Line  Dial 988, then press 1  Text 287021    No show policy:  We understand unexpected circumstances arise; however, anytime you miss your appointment we are unable to provide you appropriate care.  In addition, each appointment missed could have been used to provide care for others.  We ask that you call at least 24 hours in advance to cancel or reschedule an appointment. We would like to take  this opportunity to remind you of our policy stating patients who miss THREE appointments without cancelling or rescheduling 24 hours in advance of the appointment may be subject to cancellation of any further visits with our clinic. Please call [phone] to reschedule your appointment. If there are reasons that make it difficult for you to keep the appointments, please call and let us know how we can help. Please understand that medication prescribing will not continue without seeing your provider.        MEDICATION ISSUES:  INSPECT reviewed as expected    Discussed medication options and treatment plan of prescribed medication as well as the risks, benefits, and side effects including potential falls, possible impaired driving and metabolic adversities among others. Patient is agreeable to call the office with any worsening of symptoms or onset of side effects. Patient is agreeable to call 911 or go to the nearest ER should he/she begin having SI/HI. No medication side effects or related complaints today.     MEDS ORDERED DURING VISIT:  No orders of the defined types were placed in this encounter.      No follow-ups on file.         This document has been electronically signed by LOBO Vance  November 27, 2024 09:54 EST    Part of this note may be an electronic transcription/translation of spoken language to printed text using the Dragon Dictation System. Some of the data in this electronic note has been brought forward from a previous encounter, any necessary changes have been made, it has been reviewed by this APRN, and it is accurate.

## 2024-11-27 ENCOUNTER — OFFICE VISIT (OUTPATIENT)
Dept: PSYCHIATRY | Facility: CLINIC | Age: 59
End: 2024-11-27
Payer: OTHER GOVERNMENT

## 2024-11-27 DIAGNOSIS — F33.2 SEVERE EPISODE OF RECURRENT MAJOR DEPRESSIVE DISORDER, WITHOUT PSYCHOTIC FEATURES: Primary | Chronic | ICD-10-CM

## 2024-11-27 NOTE — PROGRESS NOTES
Spravato Monitoring Note    Start time of observation: 806  BP check prior to administration: 120/65  O2-97    Rajendra Haydenreuben   presented 11/27/2024  for clinical monitoring of self administration of Spravato. The patient used a total of 3 devices, self-administered intranasally, with a 5-minute rest between each device.    Each device was checked by [facility] for appropriate expiration and that 2 green indicator dots were present prior to patient administration. Afterwards, the device was checked by [facility] to ensure the green indicator dots were gone and the full medication was delivered.    Patient check at 846 with BP reading of 114/76  O2-97  Patient check at 1006 with BP reading of 125/80  O2-98    End time of observation: 1006    Patient monitored for 2 hours Patient tolerated the procedure well without complications..       NDC 81555-325-92  LOT [id number]  EXP 04/01/2027

## 2024-12-02 ENCOUNTER — OFFICE VISIT (OUTPATIENT)
Dept: PSYCHIATRY | Facility: CLINIC | Age: 59
End: 2024-12-02
Payer: OTHER GOVERNMENT

## 2024-12-02 DIAGNOSIS — F33.2 SEVERE EPISODE OF RECURRENT MAJOR DEPRESSIVE DISORDER, WITHOUT PSYCHOTIC FEATURES: Primary | Chronic | ICD-10-CM

## 2024-12-02 PROCEDURE — G2083 VISIT ESKETAMINE, > 56M: HCPCS

## 2024-12-02 NOTE — PROGRESS NOTES
Spravato Monitoring Note    Start time of observation: 806  BP check prior to administration: 120/79  O2-97    Rajendra David   presented 12/02/2024  for clinical monitoring of self administration of Spravato. The patient used a total of 3 devices, self-administered intranasally, with a 5-minute rest between each device.    Each device was checked by [facility] for appropriate expiration and that 2 green indicator dots were present prior to patient administration. Afterwards, the device was checked by [facility] to ensure the green indicator dots were gone and the full medication was delivered.    Patient check at 846 with BP reading of 117/73  O2-98  Patient check at 1006 with BP reading of 134/85  O2-98    End time of observation: 1006    Patient monitored for 2 hours Patient tolerated the procedure well without complications..       NDC 15359-975-89  LOT [id number]  EXP 04/01/2027

## 2024-12-02 NOTE — PROGRESS NOTES
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 Detected   Final    Perphenazine, Ur 10/21/2024 Not Detected   Final    Aripiprazole 10/21/2024 Not Detected   Final    Asenapine 10/21/2024 Not Detected   Final    Iloperidone 10/21/2024 Not Detected   Final    Lurasidone 10/21/2024 Not Detected   Final    KRATOM IA 10/21/2024 Negative  CUTOFF:5.0 ng/mL Final    Amphetamines Confirmation 10/21/2024 Negative   Final    METHAMPHETAMINE 10/21/2024 Not Detected  ng/mg creat Final    AMPHETAMINE 10/21/2024 Not Detected  ng/mg creat Final    MDMA-Ecstasy 10/21/2024 Not Detected  ng/mg creat Final    MDA 10/21/2024 Not Detected  ng/mg creat Final    Cannabinoid Confirmation 10/21/2024 +POSITIVE+   Final    Carboxy THC 10/21/2024 95  ng/mg creat Final    Comment: This test is not intended to distinguish between the metabolites of  delta-9-tetrahydrocannabinol, the predominant form of THC in most  herbal or marijuana-based products, and delta-8-tetrahydrocannabinol,  a psychoactive compound generally synthesized from other  cannabinoids.         Assessment & Plan   Diagnoses and all orders for this  visit:    1. Severe episode of recurrent major depressive disorder, without psychotic features (Primary)      Continue Spravato 84mg twice weekly for this week. Next week, plan to decrease to once weekly.     Patient will continue Effexor 75mg daily, buspirone 7.5mg daily, trazodone 100mg nightly, [facility].       Visit Diagnoses:    ICD-10-CM ICD-9-CM   1. Severe episode of recurrent major depressive disorder, without psychotic features  F33.2 296.33     Pt history, review of systems, medications, allergies, reviewed, patient was screened today for depression/anxiety, PHQ/SIXTO scores reviewed.  Most recent vitals/labs reviewed.  Pt was given appropriate time to ask questions and concerns were addressed. A thorough discussion was had that included review of disease process, need for continued monitoring and additional treatment options including use of pharmacological and non-pharmacological approaches to care, decisions were made and agreed upon by patient and provider.   Discussed the risks, benefits, and potential side effects of the medications; patient ackowledged and verbally consented.     TREATMENT PLAN/GOALS: Continue supportive psychotherapy efforts and medications as indicated. Treatment and medication options discussed during today's visit. Patient ackowledged and verbally consented to continue with current treatment plan and was educated on the importance of compliance with treatment and follow-up appointments.    -Short-Term Goals: Patient will be compliant with medication management and note improvement in S/S over the next 4 to 6 weeks or at next scheduled visit.  -Long-Term Goals: Patient will be compliant with the agreed treatment plan including medication regimen & F/U appt's and deny impairment in daily functioning over the next 6 months.      CRISIS RESOURCES:    In the event you have personal crisis, there are several resources to reach someone to talk with:    988 Suicide and  Crisis Lifeline  Call or text 983 or chat [employer]87[employer]  [facility]'s National Helpline  4-711-356-HELP (4357)  Text your zip code to 602813 (HELP4U)  [facility] Crisis Line  Dial 988, then press 1  Text 844061    No show policy:  We understand unexpected circumstances arise; however, anytime you miss your appointment we are unable to provide you appropriate care.  In addition, each appointment missed could have been used to provide care for others.  We ask that you call at least 24 hours in advance to cancel or reschedule an appointment. We would like to take this opportunity to remind you of our policy stating patients who miss THREE appointments without cancelling or rescheduling 24 hours in advance of the appointment may be subject to cancellation of any further visits with our clinic. Please call [phone] to reschedule your appointment. If there are reasons that make it difficult for you to keep the appointments, please call and let us know how we can help. Please understand that medication prescribing will not continue without seeing your provider.        MEDICATION ISSUES:  INSPECT reviewed as expected    Discussed medication options and treatment plan of prescribed medication as well as the risks, benefits, and side effects including potential falls, possible impaired driving and metabolic adversities among others. Patient is agreeable to call the office with any worsening of symptoms or onset of side effects. Patient is agreeable to call 911 or go to the nearest ER should he/she begin having SI/HI. No medication side effects or related complaints today.     MEDS ORDERED DURING VISIT:  No orders of the defined types were placed in this encounter.      No follow-ups on file.         This document has been electronically signed by LOBO Vance  December 2, 2024 11:23 EST    Part of this note may be an electronic transcription/translation of spoken language to printed text using the Dragon  Dictation System. Some of the data in this electronic note has been brought forward from a previous encounter, any necessary changes have been made, it has been reviewed by this APRN, and it is accurate.

## 2024-12-02 NOTE — PROGRESS NOTES
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    Trimipramine 10/21/2024 Not Detected   Final    Venlafaxine 10/21/2024 PRESENT   Final    Desmethylvenlafaxine, Ur 10/21/2024 PRESENT   Final    Vilazodone, Ur 10/21/2024 Not Detected   Final    Antipsychotics, Ur 10/21/2024 Negative   Final    Chlorpromazine 10/21/2024 Not Detected   Final    Clozapine, Ur 10/21/2024 Not Detected   Final    Desmethylclozapine, Ur 10/21/2024 Not Detected   Final    Loxapine, Ur 10/21/2024 Not Detected   Final    8-Hydroxyloxapine 10/21/2024 Not Detected   Final    Mesoridazine 10/21/2024 Not Detected   Final    Olanzapine 10/21/2024 Not Detected   Final    Quetiapine 10/21/2024 Not Detected   Final    Risperidone 10/21/2024 Not Detected   Final    Fluphenazine 10/21/2024 Not Detected   Final    Haloperidol 10/21/2024 Not Detected   Final    THIORIDAZINE, UR 10/21/2024 Not Detected   Final    Molindone, Ur 10/21/2024 Not Detected   Final    Pimozide, Ur 10/21/2024 Not Detected   Final    Prochlorperazine, Ur 10/21/2024 Not Detected   Final    Thiothixene  10/21/2024 Not Detected   Final    Trifluoperazine 10/21/2024 Not Detected   Final    Ziprasidone 10/21/2024 Not Detected   Final    Perphenazine, Ur 10/21/2024 Not Detected   Final    Aripiprazole 10/21/2024 Not Detected   Final    Asenapine 10/21/2024 Not Detected   Final    Iloperidone 10/21/2024 Not Detected   Final    Lurasidone 10/21/2024 Not Detected   Final    KRATOM IA 10/21/2024 Negative  CUTOFF:5.0 ng/mL Final    Amphetamines Confirmation 10/21/2024 Negative   Final    METHAMPHETAMINE 10/21/2024 Not Detected  ng/mg creat Final    AMPHETAMINE 10/21/2024 Not Detected  ng/mg creat Final    MDMA-Ecstasy 10/21/2024 Not Detected  ng/mg creat Final    MDA 10/21/2024 Not Detected  ng/mg creat Final    Cannabinoid Confirmation 10/21/2024 +POSITIVE+   Final    Carboxy THC 10/21/2024 95  ng/mg creat Final    Comment: This test is not intended to distinguish between the metabolites of  delta-9-tetrahydrocannabinol, the predominant form of THC in most  herbal or marijuana-based products, and delta-8-tetrahydrocannabinol,  a psychoactive compound generally synthesized from other  cannabinoids.         Assessment & Plan   Diagnoses and all orders for this visit:    1. Severe episode of recurrent major depressive disorder, without psychotic features (Primary)        Continue Spravato 84mg, decrease to once weekly.     Patient will continue Effexor 75mg daily, buspirone 7.5mg daily, trazodone 100mg nightly, [facility].       Visit Diagnoses:    ICD-10-CM ICD-9-CM   1. Severe episode of recurrent major depressive disorder, without psychotic features  F33.2 296.33       Pt history, review of systems, medications, allergies, reviewed, patient was screened today for depression/anxiety, PHQ/SIXTO scores reviewed.  Most recent vitals/labs reviewed.  Pt was given appropriate time to ask questions and concerns were addressed. A thorough discussion was had that included review of disease process, need for continued  monitoring and additional treatment options including use of pharmacological and non-pharmacological approaches to care, decisions were made and agreed upon by patient and provider.   Discussed the risks, benefits, and potential side effects of the medications; patient ackowledged and verbally consented.     TREATMENT PLAN/GOALS: Continue supportive psychotherapy efforts and medications as indicated. Treatment and medication options discussed during today's visit. Patient ackowledged and verbally consented to continue with current treatment plan and was educated on the importance of compliance with treatment and follow-up appointments.    -Short-Term Goals: Patient will be compliant with medication management and note improvement in S/S over the next 4 to 6 weeks or at next scheduled visit.  -Long-Term Goals: Patient will be compliant with the agreed treatment plan including medication regimen & F/U appt's and deny impairment in daily functioning over the next 6 months.      CRISIS RESOURCES:    In the event you have personal crisis, there are several resources to reach someone to talk with:    988 Suicide and Crisis Lifeline  Call or text 988 or chat [employer]  [facility]'s National Helpline  7-689-218-HELP (4357)  Text your zip code to 515025 (HELP4U)  [place]'s Crisis Line  Dial 988, then press 1  Text 794986    No show policy:  We understand unexpected circumstances arise; however, anytime you miss your appointment we are unable to provide you appropriate care.  In addition, each appointment missed could have been used to provide care for others.  We ask that you call at least 24 hours in advance to cancel or reschedule an appointment. We would like to take this opportunity to remind you of our policy stating patients who miss THREE appointments without cancelling or rescheduling 24 hours in advance of the appointment may be subject to cancellation of any further visits with our clinic. Please call  [phone] to reschedule your appointment. If there are reasons that make it difficult for you to keep the appointments, please call and let us know how we can help. Please understand that medication prescribing will not continue without seeing your provider.        MEDICATION ISSUES:  INSPECT reviewed as expected    Discussed medication options and treatment plan of prescribed medication as well as the risks, benefits, and side effects including potential falls, possible impaired driving and metabolic adversities among others. Patient is agreeable to call the office with any worsening of symptoms or onset of side effects. Patient is agreeable to call 911 or go to the nearest ER should he/she begin having SI/HI. No medication side effects or related complaints today.     MEDS ORDERED DURING VISIT:  No orders of the defined types were placed in this encounter.      Return in about 1 week (around 12/11/2024).         This document has been electronically signed by LOBO Vance  December 4, 2024 09:54 EST    Part of this note may be an electronic transcription/translation of spoken language to printed text using the Dragon Dictation System. Some of the data in this electronic note has been brought forward from a previous encounter, any necessary changes have been made, it has been reviewed by this APRN, and it is accurate.

## 2024-12-04 ENCOUNTER — OFFICE VISIT (OUTPATIENT)
Dept: PSYCHIATRY | Facility: CLINIC | Age: 59
End: 2024-12-04
Payer: OTHER GOVERNMENT

## 2024-12-04 DIAGNOSIS — F33.2 SEVERE EPISODE OF RECURRENT MAJOR DEPRESSIVE DISORDER, WITHOUT PSYCHOTIC FEATURES: Primary | Chronic | ICD-10-CM

## 2024-12-04 PROCEDURE — G2083 VISIT ESKETAMINE, > 56M: HCPCS

## 2024-12-04 PROCEDURE — 96127 BRIEF EMOTIONAL/BEHAV ASSMT: CPT

## 2024-12-04 NOTE — PROGRESS NOTES
Spravato Monitoring Note    Start time of observation: 809  BP check prior to administration: 112/70  O2-98    Rajendra Haydenreuben   presented 12/04/2024  for clinical monitoring of self administration of Spravato. The patient used a total of 3 devices, self-administered intranasally, with a 5-minute rest between each device.    Each device was checked by [facility] for appropriate expiration and that 2 green indicator dots were present prior to patient administration. Afterwards, the device was checked by [facility] to ensure the green indicator dots were gone and the full medication was delivered.    Patient check at 849 with BP reading of 118/74  O2-98  Patient check at 1009 with BP reading of 114/74  O2-98    End time of observation: 1009    Patient monitored for 2 hours Patient tolerated the procedure well without complications..       NDC 53059-892-58  LOT [id number]  EXP 4/2027

## 2024-12-05 NOTE — PROGRESS NOTES
I have reviewed the notes, assessments, and/or procedures performed by Lisa Cartwright, I concur with her/his documentation of Rajendra Hernandez.

## 2024-12-11 ENCOUNTER — OFFICE VISIT (OUTPATIENT)
Dept: PSYCHIATRY | Facility: CLINIC | Age: 59
End: 2024-12-11
Payer: OTHER GOVERNMENT

## 2024-12-11 DIAGNOSIS — F33.2 SEVERE EPISODE OF RECURRENT MAJOR DEPRESSIVE DISORDER, WITHOUT PSYCHOTIC FEATURES: Primary | Chronic | ICD-10-CM

## 2024-12-11 NOTE — PROGRESS NOTES
Spravato Monitoring Note    Start time of observation: 802  BP check prior to administration: 105/60  O2 97    Rajendra Haydenreuben   presented 12/11/2024  for clinical monitoring of self administration of Spravato. The patient used a total of 3 devices, self-administered intranasally, with a 5-minute rest between each device.    Each device was checked by [facility] for appropriate expiration and that 2 green indicator dots were present prior to patient administration. Afterwards, the device was checked by [facility] to ensure the green indicator dots were gone and the full medication was delivered.    Patient check at 842 with BP reading of 117/76  O2 97  Patient check at 1002 with BP reading of 114/72  O2 98    End time of observation: 1002    Patient monitored for 2 hours Patient tolerated the procedure well without complications..       NDC 71541-676-11  LOT [id number]  EXP 04/01/2027

## 2024-12-11 NOTE — PROGRESS NOTES
Rajendra stated that he has taken a job in [place]. Possible move in January/February. He will keep us updated with Spravato orders.

## 2024-12-11 NOTE — PROGRESS NOTES
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   Final    Fluvoxamine 10/21/2024 Not Detected   Final    Imipramine 10/21/2024 Not Detected   Final    Mirtazapine 10/21/2024 Not Detected   Final    Nortriptyline 10/21/2024 Not Detected   Final    Paroxetine 10/21/2024 Not Detected   Final    Protriptyline 10/21/2024 Not Detected   Final    Sertraline, Ur 10/21/2024 Not Detected   Final    Desmethylsertraline 10/21/2024 Not Detected   Final    Maprotiline 10/21/2024 Not Detected   Final    Nefazodone, Ur 10/21/2024 Not Detected   Final    Trazodone 10/21/2024 PRESENT   Final    1,3 chlorophenyl piperazine 10/21/2024 PRESENT   Final    Trimipramine 10/21/2024 Not Detected   Final    Venlafaxine 10/21/2024 PRESENT   Final    Desmethylvenlafaxine, Ur 10/21/2024 PRESENT   Final    Vilazodone, Ur 10/21/2024 Not Detected   Final    Antipsychotics, Ur 10/21/2024 Negative   Final    Chlorpromazine 10/21/2024 Not Detected   Final    Clozapine, Ur 10/21/2024 Not Detected   Final    Desmethylclozapine, Ur 10/21/2024 Not Detected   Final    Loxapine, Ur 10/21/2024 Not Detected   Final     8-Hydroxyloxapine 10/21/2024 Not Detected   Final    Mesoridazine 10/21/2024 Not Detected   Final    Olanzapine 10/21/2024 Not Detected   Final    Quetiapine 10/21/2024 Not Detected   Final    Risperidone 10/21/2024 Not Detected   Final    Fluphenazine 10/21/2024 Not Detected   Final    Haloperidol 10/21/2024 Not Detected   Final    THIORIDAZINE, UR 10/21/2024 Not Detected   Final    Molindone, Ur 10/21/2024 Not Detected   Final    Pimozide, Ur 10/21/2024 Not Detected   Final    Prochlorperazine, Ur 10/21/2024 Not Detected   Final    Thiothixene 10/21/2024 Not Detected   Final    Trifluoperazine 10/21/2024 Not Detected   Final    Ziprasidone 10/21/2024 Not Detected   Final    Perphenazine, Ur 10/21/2024 Not Detected   Final    Aripiprazole 10/21/2024 Not Detected   Final    Asenapine 10/21/2024 Not Detected   Final    Iloperidone 10/21/2024 Not Detected   Final    Lurasidone 10/21/2024 Not Detected   Final    KRATOM IA 10/21/2024 Negative  CUTOFF:5.0 ng/mL Final    Amphetamines Confirmation 10/21/2024 Negative   Final    METHAMPHETAMINE 10/21/2024 Not Detected  ng/mg creat Final    AMPHETAMINE 10/21/2024 Not Detected  ng/mg creat Final    MDMA-Ecstasy 10/21/2024 Not Detected  ng/mg creat Final    MDA 10/21/2024 Not Detected  ng/mg creat Final    Cannabinoid Confirmation 10/21/2024 +POSITIVE+   Final    Carboxy THC 10/21/2024 95  ng/mg creat Final    Comment: This test is not intended to distinguish between the metabolites of  delta-9-tetrahydrocannabinol, the predominant form of THC in most  herbal or marijuana-based products, and delta-8-tetrahydrocannabinol,  a psychoactive compound generally synthesized from other  cannabinoids.         Assessment & Plan   Diagnoses and all orders for this visit:    1. Severe episode of recurrent major depressive disorder, without psychotic features (Primary)        Continue Spravato 84mg, decrease to once weekly.     Patient will continue Effexor 75mg daily, buspirone 7.5mg  daily, trazodone 100mg nightly, [facility].       Visit Diagnoses:    ICD-10-CM ICD-9-CM   1. Severe episode of recurrent major depressive disorder, without psychotic features  F33.2 296.33       Pt history, review of systems, medications, allergies, reviewed, patient was screened today for depression/anxiety, PHQ/SIXTO scores reviewed.  Most recent vitals/labs reviewed.  Pt was given appropriate time to ask questions and concerns were addressed. A thorough discussion was had that included review of disease process, need for continued monitoring and additional treatment options including use of pharmacological and non-pharmacological approaches to care, decisions were made and agreed upon by patient and provider.   Discussed the risks, benefits, and potential side effects of the medications; patient ackowledged and verbally consented.     TREATMENT PLAN/GOALS: Continue supportive psychotherapy efforts and medications as indicated. Treatment and medication options discussed during today's visit. Patient ackowledged and verbally consented to continue with current treatment plan and was educated on the importance of compliance with treatment and follow-up appointments.    -Short-Term Goals: Patient will be compliant with medication management and note improvement in S/S over the next 4 to 6 weeks or at next scheduled visit.  -Long-Term Goals: Patient will be compliant with the agreed treatment plan including medication regimen & F/U appt's and deny impairment in daily functioning over the next 6 months.      CRISIS RESOURCES:    In the event you have personal crisis, there are several resources to reach someone to talk with:    [facility] Suicide and Crisis Lifeline  Call or text 981 or chat 988lifeline.org  [facility]'s National Helpline  1-926-393-HELP (4081)  Text your zip code to [address]8859 (HELP4U)  [place] Crisis Line  Dial 988, then press 1  Text 055004    No show policy:  We understand unexpected circumstances  arise; however, anytime you miss your appointment we are unable to provide you appropriate care.  In addition, each appointment missed could have been used to provide care for others.  We ask that you call at least 24 hours in advance to cancel or reschedule an appointment. We would like to take this opportunity to remind you of our policy stating patients who miss THREE appointments without cancelling or rescheduling 24 hours in advance of the appointment may be subject to cancellation of any further visits with our clinic. Please call [phone] to reschedule your appointment. If there are reasons that make it difficult for you to keep the appointments, please call and let us know how we can help. Please understand that medication prescribing will not continue without seeing your provider.        MEDICATION ISSUES:  INSPECT reviewed as expected    Discussed medication options and treatment plan of prescribed medication as well as the risks, benefits, and side effects including potential falls, possible impaired driving and metabolic adversities among others. Patient is agreeable to call the office with any worsening of symptoms or onset of side effects. Patient is agreeable to call 911 or go to the nearest ER should he/she begin having SI/HI. No medication side effects or related complaints today.     MEDS ORDERED DURING VISIT:  No orders of the defined types were placed in this encounter.      Return in about 1 week (around 12/18/2024).         This document has been electronically signed by LOBO Vance  December 11, 2024 08:45 EST    Part of this note may be an electronic transcription/translation of spoken language to printed text using the Dragon Dictation System. Some of the data in this electronic note has been brought forward from a previous encounter, any necessary changes have been made, it has been reviewed by this APRN, and it is accurate.

## 2024-12-18 ENCOUNTER — OFFICE VISIT (OUTPATIENT)
Dept: PSYCHIATRY | Facility: CLINIC | Age: 59
End: 2024-12-18
Payer: OTHER GOVERNMENT

## 2024-12-18 DIAGNOSIS — F33.2 SEVERE EPISODE OF RECURRENT MAJOR DEPRESSIVE DISORDER, WITHOUT PSYCHOTIC FEATURES: Primary | Chronic | ICD-10-CM

## 2024-12-18 NOTE — PROGRESS NOTES
Spravato Monitoring Note    Start time of observation: 809  BP check prior to administration: 102/63  O2- 98    Rajendra Haydenreuben   presented 12/18/2024  for clinical monitoring of self administration of Spravato. The patient used a total of 3 devices, self-administered intranasally, with a 5-minute rest between each device.    Each device was checked by [facility] for appropriate expiration and that 2 green indicator dots were present prior to patient administration. Afterwards, the device was checked by [facility] to ensure the green indicator dots were gone and the full medication was delivered.    Patient check at 849 with BP reading of 141/83  O2-99  Patient check at 1009 with BP reading of 118/75  O2-98    End time of observation: 1009    Patient monitored for 2 hours Patient tolerated the procedure well without complications..       NDC 17043-430-45  LOT [id number]  EXP 4/01/2027

## 2024-12-18 NOTE — PROGRESS NOTES
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 Not Detected   Final    Desmethylclomipramine 10/21/2024 Not Detected   Final    Desipramine 10/21/2024 Not Detected   Final    Doxepin 10/21/2024 Not Detected   Final    Desmethyldoxepin, Ur 10/21/2024 Not Detected   Final    Duloxetine, Ur 10/21/2024 Not Detected   Final    Fluoxetine, Ur 10/21/2024 Not Detected   Final    Norfluoxetine, Ur 10/21/2024 Not Detected   Final    Fluvoxamine 10/21/2024 Not Detected   Final    Imipramine 10/21/2024 Not Detected   Final    Mirtazapine 10/21/2024 Not Detected   Final    Nortriptyline 10/21/2024 Not Detected   Final    Paroxetine 10/21/2024 Not Detected   Final    Protriptyline 10/21/2024 Not Detected   Final    Sertraline, Ur 10/21/2024 Not Detected   Final    Desmethylsertraline 10/21/2024 Not Detected   Final    Maprotiline 10/21/2024 Not Detected   Final    Nefazodone, Ur 10/21/2024 Not Detected   Final    Trazodone 10/21/2024 PRESENT   Final    1,3 chlorophenyl piperazine 10/21/2024 PRESENT   Final    Trimipramine 10/21/2024 Not Detected   Final    Venlafaxine 10/21/2024 PRESENT   Final    Desmethylvenlafaxine, Ur 10/21/2024 PRESENT   Final     Vilazodone, Ur 10/21/2024 Not Detected   Final    Antipsychotics, Ur 10/21/2024 Negative   Final    Chlorpromazine 10/21/2024 Not Detected   Final    Clozapine, Ur 10/21/2024 Not Detected   Final    Desmethylclozapine, Ur 10/21/2024 Not Detected   Final    Loxapine, Ur 10/21/2024 Not Detected   Final    8-Hydroxyloxapine 10/21/2024 Not Detected   Final    Mesoridazine 10/21/2024 Not Detected   Final    Olanzapine 10/21/2024 Not Detected   Final    Quetiapine 10/21/2024 Not Detected   Final    Risperidone 10/21/2024 Not Detected   Final    Fluphenazine 10/21/2024 Not Detected   Final    Haloperidol 10/21/2024 Not Detected   Final    THIORIDAZINE, UR 10/21/2024 Not Detected   Final    Molindone, Ur 10/21/2024 Not Detected   Final    Pimozide, Ur 10/21/2024 Not Detected   Final    Prochlorperazine, Ur 10/21/2024 Not Detected   Final    Thiothixene 10/21/2024 Not Detected   Final    Trifluoperazine 10/21/2024 Not Detected   Final    Ziprasidone 10/21/2024 Not Detected   Final    Perphenazine, Ur 10/21/2024 Not Detected   Final    Aripiprazole 10/21/2024 Not Detected   Final    Asenapine 10/21/2024 Not Detected   Final    Iloperidone 10/21/2024 Not Detected   Final    Lurasidone 10/21/2024 Not Detected   Final    KRATOM IA 10/21/2024 Negative  CUTOFF:5.0 ng/mL Final    Amphetamines Confirmation 10/21/2024 Negative   Final    METHAMPHETAMINE 10/21/2024 Not Detected  ng/mg creat Final    AMPHETAMINE 10/21/2024 Not Detected  ng/mg creat Final    MDMA-Ecstasy 10/21/2024 Not Detected  ng/mg creat Final    MDA 10/21/2024 Not Detected  ng/mg creat Final    Cannabinoid Confirmation 10/21/2024 +POSITIVE+   Final    Carboxy THC 10/21/2024 95  ng/mg creat Final    Comment: This test is not intended to distinguish between the metabolites of  delta-9-tetrahydrocannabinol, the predominant form of THC in most  herbal or marijuana-based products, and delta-8-tetrahydrocannabinol,  a psychoactive compound generally synthesized from  other  cannabinoids.         Assessment & Plan   Diagnoses and all orders for this visit:    1. Severe episode of recurrent major depressive disorder, without psychotic features (Primary)          Continue Spravato 84mg. One time weekly started on 12/18/24.     Patient will continue Effexor 75mg daily, buspirone 7.5mg daily, trazodone 100mg nightly, [facility].       Visit Diagnoses:    ICD-10-CM ICD-9-CM   1. Severe episode of recurrent major depressive disorder, without psychotic features  F33.2 296.33         Pt history, review of systems, medications, allergies, reviewed, patient was screened today for depression/anxiety, PHQ/SIXTO scores reviewed.  Most recent vitals/labs reviewed.  Pt was given appropriate time to ask questions and concerns were addressed. A thorough discussion was had that included review of disease process, need for continued monitoring and additional treatment options including use of pharmacological and non-pharmacological approaches to care, decisions were made and agreed upon by patient and provider.   Discussed the risks, benefits, and potential side effects of the medications; patient ackowledged and verbally consented.     TREATMENT PLAN/GOALS: Continue supportive psychotherapy efforts and medications as indicated. Treatment and medication options discussed during today's visit. Patient ackowledged and verbally consented to continue with current treatment plan and was educated on the importance of compliance with treatment and follow-up appointments.    -Short-Term Goals: Patient will be compliant with medication management and note improvement in S/S over the next 4 to 6 weeks or at next scheduled visit.  -Long-Term Goals: Patient will be compliant with the agreed treatment plan including medication regimen & F/U appt's and deny impairment in daily functioning over the next 6 months.      CRISIS RESOURCES:    In the event you have personal crisis, there are several  resources to reach someone to talk with:    988 Suicide and Crisis Lifeline  Call or text 986 or chat 988lifeline.org  [facility]'s National Helpline  4-793-084-HELP (4357)  Text your zip code to 812874 (HELP4U)  's Crisis Line  Dial 988, then press 1  Text 186844    No show policy:  We understand unexpected circumstances arise; however, anytime you miss your appointment we are unable to provide you appropriate care.  In addition, each appointment missed could have been used to provide care for others.  We ask that you call at least 24 hours in advance to cancel or reschedule an appointment. We would like to take this opportunity to remind you of our policy stating patients who miss THREE appointments without cancelling or rescheduling 24 hours in advance of the appointment may be subject to cancellation of any further visits with our clinic. Please call [phone] to reschedule your appointment. If there are reasons that make it difficult for you to keep the appointments, please call and let us know how we can help. Please understand that medication prescribing will not continue without seeing your provider.        MEDICATION ISSUES:  INSPECT reviewed as expected    Discussed medication options and treatment plan of prescribed medication as well as the risks, benefits, and side effects including potential falls, possible impaired driving and metabolic adversities among others. Patient is agreeable to call the office with any worsening of symptoms or onset of side effects. Patient is agreeable to call 911 or go to the nearest ER should he/she begin having SI/HI. No medication side effects or related complaints today.     MEDS ORDERED DURING VISIT:  No orders of the defined types were placed in this encounter.      Return in about 1 week (around 12/25/2024).         This document has been electronically signed by LOBO Vance  December 18, 2024 08:41 EST    Part of this note may be an electronic  transcription/translation of spoken language to printed text using the Dragon Dictation System. Some of the data in this electronic note has been brought forward from a previous encounter, any necessary changes have been made, it has been reviewed by this APRN, and it is accurate.

## 2024-12-23 ENCOUNTER — OFFICE VISIT (OUTPATIENT)
Dept: PSYCHIATRY | Facility: CLINIC | Age: 59
End: 2024-12-23
Payer: OTHER GOVERNMENT

## 2024-12-23 DIAGNOSIS — F33.2 SEVERE EPISODE OF RECURRENT MAJOR DEPRESSIVE DISORDER, WITHOUT PSYCHOTIC FEATURES: Primary | ICD-10-CM

## 2024-12-23 NOTE — PROGRESS NOTES
"I, Nila Duffy, DNP, APRN, was present in the office suite and immediately available to furnish assistance and direction throughout the entire observation time.     Chief Complaint: spravato treatment     Patient tolerated the procedure well without complications..     Side effects of treatment were mild and included fatigue .    Patient remained stable throughout treatment.    Patient denies chest pain, shortness of breath, headache, vision changes.     HPI: Rajendra Hernandez, a 59 y.o. male patient seen for follow up and Spravato treatment #12    12/18/24: Patient seen this morning for follow up and Spravato treatment. He is upbeat, and states he is doing well. He participates in 3 different groups each week, some being meditation groups. He feels these have been very helpful with his success with Spravato. He states he has had no SI this week. He is still on Effexor, and hasn't made any medication changes. He is planning to come for his second weekly session next Monday, and then he will be out for a week traveling for the holidays.   Denies any SI/HI/AVH.      12/11/24: Patient here for follow up and Spravato treatment. He states he is still noticing an improvement with depression. He says he has [facility] in [place], and is planning to move there for that position. He has not been given a start date yet, but could potentially be as soon as January. I advised him to keep us updated, and as soon as he knows a start date, he needs to start scheduling follow up appointments there. He expressed understanding. He denies any SI/HI/AVH today. Denies even having any passive SI this week. Overall, he has responded very well to Spravato treatment.      12/4/24: Patient here today for follow up and Spravato treatment. He reports he can tell a slight improvement in depression since starting Spravato. He states the \"surface level\" stuff doesn't get to him as much since starting treatment. He feels like he is " able to let things go easier. He reports less frequency in the passive suicidal thoughts as well, stating he doesn't think he has had any this week. We discussed going to a once weekly schedule next week for about 4 weeks, and he was agreeable to that. Denies any SI/HI/AVH.      Patient completed a Quick Inventory of Depressive Symptomatology  today. Patient score today was a 12, still in the moderate category. Patient did improve though, reporting today he feels sad less than half the time. Previously reported feeling sad more than half the time.      Previous score on 11/7/24 was a 13, moderate.      12/2/24: Patient here today for follow up and Spravato treatment. Patient states he had a good Thanksgiving. Everyone in his family did a health conscious dinner, and they grilled salmon. He recently got the diagnosis of pre-diabetes, so he is trying to be conscious of that. He states overall depression has improved. He says he did not have any passive SI over this last week, so that is a huge improvement. Overall, he can tell an improvement in his depression since starting Spravato treatments. Denies HI/AVH.      11/27/24: Patient here for Spravato treatment and follow up. He had an appointment on Monday, and did not show up or call. Patient states he tried to call before 8am, but he was sent to the answering machine, and it would not allow him to leave a message. He then states he fell back asleep and didn't call later in the day. Patient reports he had a migraine. I stressed to him the need to make sure he calls until he gets someone on the phone if he is not able to make his appointment. The phones get turned on at 8am. Patient expressed understanding. Discussed doing one more week next week, of twice weekly sessions, then going to once weekly. Patient denies any active suicidal thoughts with any plan or intent.      11/21/24: Patient seen today for Spravato treatment and follow up. Patient reports he didn't feel  as much benefit from his last session, as the previous one. He says he was internally struggling with some things, and things didn't feel as clear. He denies any active SI with any plan or intent.      11/20/24: Patient seen today for Spravato treatment and follow up. He reports feeling like the Spravato is helping him be able to more deeply process traumas from his past in EMDR therapy. He can tell a significant improvement in that. He also reports decrease in frequency of the passive/fleeting SI he has. He reports only having one fleeting thought this week.      11/14/24: Patient seen today for follow up and Spravato therapy. Patient reports he still feels about the same. He says Spravato is helping him better process his trauma during EMDR therapy. He is still having passive thoughts of wishing he were dead, but feels as though they are lessened. Overall, he feels like he is seeing positive improvements since being back on Spravato. Denies any active suicidal thoughts with any plan or intent.      11/12/24: Patient seen today for follow up and Spravato treatment. He states he is going to talk to [facility] about medications. Today will be his first treatment at the higher, 84mg dose. Advised him to hold off on other medication changes at this time, and see how the 84mg dose did. He reports still having the chronic passive SI. Denies any active SI with any plan or intent. He does feel like the frequency may be lessening, but states it is hard to tell.      11/7/24: Patient here today for second Spravato treatment. States he was able to do some trauma therapy yesterday with his therapist, and better connect with that therapy. He also reports this was easier for him than it had been in the past when he was off Spravato. Still reports depression as high. Still having passive thoughts of wishing he were dead, but denies and active thoughts with any plan or intent. Will continue bi-weekly Spravato at  "56mg for now. Potentially increase dose in the future.      24: Patient here for his first Spravato treatment. States depression has been about the same since his initial evaluation with me. Reports he has not had any changes to medications since his last visit. He still reports daily passive thoughts of wishing he were dead, but denies any active thoughts with any plan or intent. He does report the previous time he was on Spravato treatment, this symptom did improve.       24 :   Previous Notes and HPI copied from provider notes   Pt was seen today, Lisa Cartwright is current provider   Pt is now on weekly spravato treatments, reports significant benefit for mood, energy, and motivation  Depression is less intense, more manageable, not experiencing  SI   Denies feeling hopeless or helpless   Optimistic about treatment plan, Reports no changes in medications or health since last treatment             Social History     Socioeconomic History    Marital status: Unknown   Tobacco Use    Smoking status: Some Days     Current packs/day: 0.00     Types: Cigars, Cigarettes     Last attempt to quit: 1987     Years since quittin.9   Substance and Sexual Activity    Alcohol use: Not Currently    Drug use: Not Currently     Types: Amphetamines, Amyl nitrate (Poppers), \"Crack\" cocaine, Cocaine(coke), GHB, Marijuana, Methamphetamines    Sexual activity: Not Currently     Partners: Male     Birth control/protection: None       Patient denies taking any sedating medications before treatment.  Patient denies drug or alcohol today.      Vital Signs     Start time of observation: 805  BP check prior to administration: 100/56   O2-98%     Patient check at 845 with BP reading of 120/72  O2-97%  Patient check at 1005 with BP reading of 113/63  O2-100%     End time of observation: 1005        Current Outpatient Medications:     albuterol sulfate  (90 Base) MCG/ACT inhaler, Inhale 2 puffs Every 6 (Six) Hours As " Needed for Wheezing., Disp: , Rfl:     aspirin 81 MG EC tablet, Take 1 tablet by mouth Daily., Disp: , Rfl:     Bictegravir-Emtricitab-Tenofov (Biktarvy) -25 MG per tablet, Take  by mouth Daily., Disp: , Rfl:     buPROPion (ZYBAN) 150 MG 12 hr tablet, Take 150 mg by mouth Daily., Disp: , Rfl:     busPIRone (BUSPAR) 7.5 MG tablet, Take 1 tablet by mouth 3 (Three) Times a Day., Disp: , Rfl:     Cholecalciferol (Vitamin D) 10 MCG/ML liquid, Vitamin D, Disp: , Rfl:     Diclofenac Sodium (VOLTAREN) 1 % gel gel, Apply 4 g topically to the appropriate area as directed 4 (Four) Times a Day As Needed., Disp: , Rfl:     empagliflozin (JARDIANCE) 10 MG tablet tablet, Take  by mouth Daily., Disp: , Rfl:     flunisolide (NASALIDE) 25 MCG/ACT (0.025%) solution nasal spray, Inhale 2 sprays Every 12 (Twelve) Hours., Disp: , Rfl:     Loratadine 10 MG capsule, Take  by mouth., Disp: , Rfl:     melatonin 1 MG tablet, Take 3 tablets by mouth., Disp: , Rfl:     metoprolol tartrate (LOPRESSOR) 25 MG tablet, Take 1 tablet by mouth 2 (Two) Times a Day., Disp: , Rfl:     Naloxone HCl 8 MG/0.1ML liquid, Administer  into the nostril(s) as directed by provider 1 (One) Time As Needed., Disp: , Rfl:     nicotine polacrilex (COMMIT) 2 MG lozenge, Dissolve 1 lozenge in the mouth As Needed for Smoking Cessation., Disp: , Rfl:     omeprazole (priLOSEC) 20 MG capsule, Take 1 capsule by mouth Daily., Disp: , Rfl:     rosuvastatin (CRESTOR) 20 MG tablet, Take 1 tablet by mouth Daily., Disp: , Rfl:     sacubitril-valsartan (ENTRESTO) 24-26 MG tablet, Take 1 tablet by mouth 2 (Two) Times a Day., Disp: , Rfl:     spironolactone (ALDACTONE) 25 MG tablet, Take 1 tablet by mouth Daily., Disp: , Rfl:     SUMAtriptan (IMITREX) 100 MG tablet, Take 1 tablet by mouth Every 2 (Two) Hours As Needed for Migraine. Take one tablet at onset of headache. May repeat dose one time in 2 hours if headache not relieved., Disp: , Rfl:     TRAZODONE HCL PO, Take 100 mg  by mouth Every Night., Disp: , Rfl:     venlafaxine (EFFEXOR) 75 MG tablet, Take 3 tablets by mouth Daily., Disp: , Rfl:     vitamin B-12 (CYANOCOBALAMIN) 500 MCG tablet, Take 2 tablets by mouth Daily., Disp: , Rfl:     Current Facility-Administered Medications:     Esketamine HCl (84 MG Dose) Nasal Solution 84 mg, 84 mg, Nasal, Q7 Days, Lisa Cartwright APRN, 84 mg at 12/23/24 0805      Allergies   Allergen Reactions    Sertraline Swelling           PHQ9    PHQ-9 Depression Screening  Little interest or pleasure in doing things? Several days   Feeling down, depressed, or hopeless? Over half   PHQ-2 Total Score 3   Trouble falling or staying asleep, or sleeping too much? Several days   Feeling tired or having little energy? Several days   Poor appetite or overeating? Several days   Feeling bad about yourself - or that you are a failure or have let yourself or your family down? Over half   Trouble concentrating on things, such as reading the newspaper or watching television? Several days   Moving or speaking so slowly that other people could have noticed? Or the opposite - being so fidgety or restless that you have been moving around a lot more than usual? Several days   Thoughts that you would be better off dead, or of hurting yourself in some way? Not at all   PHQ-9 Total Score 10   If you checked off any problems, how difficult have these problems made it for you to do your work, take care of things at home, or get along with other people? Somewhat difficult       SIXTO-7:   Over the last two weeks, how often have you been bothered by the following problems?  Feeling nervous, anxious or on edge: Several days  Not being able to stop or control worrying: More than half the days  Worrying too much about different things: More than half the days  Trouble Relaxing: Several days  Being so restless that it is hard to sit still: Several days  Becoming easily annoyed or irritable: Several days  Feeling afraid as if  something awful might happen: More than half the days  SIXTO 7 Total Score: 10  If you checked any problems, how difficult have these problems made it for you to do your work, take care of things at home, or get along with other people: Somewhat difficult]      MENTAL STATUS EXAM   General Appearance:  Cleanly groomed and dressed  Eye Contact:  Good eye contact  Attitude:  Cooperative  Motor Activity:  Normal gait, posture  Muscle Strength:  Normal  Speech:  Normal rate, tone, volume  Language:  Spontaneous  Mood and affect:  Normal, pleasant  Hopelessness:  Denies  Thought Process:  Logical, goal-directed and linear  Associations/ Thought Content:  No delusions  Hallucinations:  None  Suicidal Ideations:  Not present  Homicidal Ideation:  Not present  Sensorium:  Alert and clear  Orientation:  Person, place, situation and time  Attention Span/ Concentration:  Good  Fund of Knowledge:  Appropriate for age and educational level  Intellectual Functioning:  Average range  Insight:  Good  Judgement:  Good  Reliability:  Good  Impulse Control:  Good       Diagnoses and all orders for this visit:    1. Severe episode of recurrent major depressive disorder, without psychotic features (Primary)        Treatment Plan:   Pt history, review of systems, medications, allergies, reviewed, patient was screened today for depression/anxiety, PHQ/SIXTO scores reviewed.  Most recent vitals/labs reviewed.  Pt was given appropriate time to ask questions and concerns were addressed. A thorough discussion was had that included review of disease process, need for continued monitoring and additional treatment options including use of pharmacological and non-pharmacological approaches to care, decisions were made and agreed upon by patient and provider.   Discussed the risks, benefits, and potential side effects of the medications; patient ackowledged and verbally consented.     Continue Spravato 84 mg weekly for MDD   Weekly treatments  started  on 12/18/24.      Continue Effexor 75mg daily, buspirone 7.5mg daily, trazodone 100mg nightly, [facility].     As part of the patient's treatment plan, I am prescribing controlled substances. The patient has been made aware of appropriate use of such medications, including potential risk of somnolence, limited ability to drive and/or work safely, and the potential for dependence or overdose. It has also bee made clear that these medications are for use by this patient only, without concomitant use of alcohol or other substances unless prescribed.      Patient has completed prescribing agreement detailing terms of continued prescribing of controlled substances, including monitoring INSPECT reports, urine drug screening, and pill counts if necessary. The patient is aware that inappropriate use will results in cessation of prescribing such medications.     INSPECT report has been reviewed and scanned into the patient's chart.     As the clinician, I personally reviewed the INSPECT while the patient was in the office today.     After the observation time, the patient was assessed by me and considered stable to leave the office with assistance. Rajendra Hernandez  was advised not to drive or operate machinery until tomorrow following a full night's sleep.    Pt agrees with a safety plan for any thoughts of self injurious behavior. Pt will call this office at [phone] or the suicide hotline at 326.  In an emergency the pt agrees to call 911.    Nila Duffy DNP, APRN  12/23/24   11:23 EST

## 2024-12-23 NOTE — PROGRESS NOTES
Spravato Monitoring Note    Start time of observation: 805  BP check prior to administration: 100/56  O2-98    Rajendra Haydenreuben   presented 12/23/2024  for clinical monitoring of self administration of Spravato. The patient used a total of 3 devices, self-administered intranasally, with a 5-minute rest between each device.    Each device was checked by [facility] for appropriate expiration and that 2 green indicator dots were present prior to patient administration. Afterwards, the device was checked by J[facility] to ensure the green indicator dots were gone and the full medication was delivered.    Patient check at 845 with BP reading of 120/72  O2-97  Patient check at 1005 with BP reading of 113/63  O2-100    End time of observation: 1005    Patient monitored for 2 hours Patient tolerated the procedure well without complications..       NDC 11612-649-92  LOT [id number]  EXP 04/01/2027

## 2024-12-30 NOTE — PROGRESS NOTES
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 Final    Carisoprodol, [place] 10/21/2024 Negative  CUTOFF:100 ng/mL Final    Antidepressants 10/21/2024 +POSITIVE+   Final    Amitriptyline 10/21/2024 Not Detected   Final    Amoxapine 10/21/2024 Not Detected   Final    8-Hydroxyamoxapine, Ur 10/21/2024 Not Detected   Final    Bupropion, Ur 10/21/2024 Not Detected   Final    Hydroxybupropion 10/21/2024 Not Detected   Final    Citalopram 10/21/2024 Not Detected   Final    Desmethylcitalopram 10/21/2024 Not Detected   Final    Clomipramine, Ur 10/21/2024 Not Detected   Final    Desmethylclomipramine 10/21/2024 Not Detected   Final    Desipramine 10/21/2024 Not Detected   Final    Doxepin 10/21/2024 Not Detected   Final    Desmethyldoxepin, Ur 10/21/2024 Not Detected   Final    Duloxetine, Ur 10/21/2024 Not Detected   Final    Fluoxetine, Ur 10/21/2024 Not Detected   Final    Norfluoxetine, Ur 10/21/2024 Not Detected   Final    Fluvoxamine 10/21/2024 Not Detected   Final    Imipramine 10/21/2024 Not Detected   Final    Mirtazapine 10/21/2024 Not Detected   Final    Nortriptyline 10/21/2024 Not Detected   Final     Paroxetine 10/21/2024 Not Detected   Final    Protriptyline 10/21/2024 Not Detected   Final    Sertraline, Ur 10/21/2024 Not Detected   Final    Desmethylsertraline 10/21/2024 Not Detected   Final    Maprotiline 10/21/2024 Not Detected   Final    Nefazodone, Ur 10/21/2024 Not Detected   Final    Trazodone 10/21/2024 PRESENT   Final    1,3 chlorophenyl piperazine 10/21/2024 PRESENT   Final    Trimipramine 10/21/2024 Not Detected   Final    Venlafaxine 10/21/2024 PRESENT   Final    Desmethylvenlafaxine, Ur 10/21/2024 PRESENT   Final    Vilazodone, Ur 10/21/2024 Not Detected   Final    Antipsychotics, Ur 10/21/2024 Negative   Final    Chlorpromazine 10/21/2024 Not Detected   Final    Clozapine, Ur 10/21/2024 Not Detected   Final    Desmethylclozapine, Ur 10/21/2024 Not Detected   Final    Loxapine, Ur 10/21/2024 Not Detected   Final    8-Hydroxyloxapine 10/21/2024 Not Detected   Final    Mesoridazine 10/21/2024 Not Detected   Final    Olanzapine 10/21/2024 Not Detected   Final    Quetiapine 10/21/2024 Not Detected   Final    Risperidone 10/21/2024 Not Detected   Final    Fluphenazine 10/21/2024 Not Detected   Final    Haloperidol 10/21/2024 Not Detected   Final    THIORIDAZINE, UR 10/21/2024 Not Detected   Final    Molindone, Ur 10/21/2024 Not Detected   Final    Pimozide, Ur 10/21/2024 Not Detected   Final    Prochlorperazine, Ur 10/21/2024 Not Detected   Final    Thiothixene 10/21/2024 Not Detected   Final    Trifluoperazine 10/21/2024 Not Detected   Final    Ziprasidone 10/21/2024 Not Detected   Final    Perphenazine, Ur 10/21/2024 Not Detected   Final    Aripiprazole 10/21/2024 Not Detected   Final    Asenapine 10/21/2024 Not Detected   Final    Iloperidone 10/21/2024 Not Detected   Final    Lurasidone 10/21/2024 Not Detected   Final    KRATOM [place] 10/21/2024 Negative  CUTOFF:5.0 ng/mL Final    Amphetamines Confirmation 10/21/2024 Negative   Final    METHAMPHETAMINE 10/21/2024 Not Detected  ng/mg creat Final     AMPHETAMINE 10/21/2024 Not Detected  ng/mg creat Final    MDMA-Ecstasy 10/21/2024 Not Detected  ng/mg creat Final    MDA 10/21/2024 Not Detected  ng/mg creat Final    Cannabinoid Confirmation 10/21/2024 +POSITIVE+   Final    Carboxy THC 10/21/2024 95  ng/mg creat Final    Comment: This test is not intended to distinguish between the metabolites of  delta-9-tetrahydrocannabinol, the predominant form of THC in most  herbal or marijuana-based products, and delta-8-tetrahydrocannabinol,  a psychoactive compound generally synthesized from other  cannabinoids.         Assessment & Plan   Diagnoses and all orders for this visit:    1. Severe episode of recurrent major depressive disorder, without psychotic features (Primary)      Continue Spravato 84mg. One time weekly started on 12/18/24.     Patient will continue Effexor 75mg daily, buspirone 7.5mg daily, trazodone 100mg nightly, [facility].       Visit Diagnoses:    ICD-10-CM ICD-9-CM   1. Severe episode of recurrent major depressive disorder, without psychotic features  F33.2 296.33           Pt history, review of systems, medications, allergies, reviewed, patient was screened today for depression/anxiety, PHQ/SIXTO scores reviewed.  Most recent vitals/labs reviewed.  Pt was given appropriate time to ask questions and concerns were addressed. A thorough discussion was had that included review of disease process, need for continued monitoring and additional treatment options including use of pharmacological and non-pharmacological approaches to care, decisions were made and agreed upon by patient and provider.   Discussed the risks, benefits, and potential side effects of the medications; patient ackowledged and verbally consented.     TREATMENT PLAN/GOALS: Continue supportive psychotherapy efforts and medications as indicated. Treatment and medication options discussed during today's visit. Patient ackowledged and verbally consented to continue with current  treatment plan and was educated on the importance of compliance with treatment and follow-up appointments.    -Short-Term Goals: Patient will be compliant with medication management and note improvement in S/S over the next 4 to 6 weeks or at next scheduled visit.  -Long-Term Goals: Patient will be compliant with the agreed treatment plan including medication regimen & F/U appt's and deny impairment in daily functioning over the next 6 months.      CRISIS RESOURCES:    In the event you have personal crisis, there are several resources to reach someone to talk with:    988 Suicide and Crisis Lifeline  Call or text 988 or chat 988[employer] That Bugline[employer]org  [facility]'s National Helpline  4-552-160-HELP (4357)  Text your zip code to [address] (HELP4U)  [place] Crisis Line  Dial 388, then press 1  Text 294399    No show policy:  We understand unexpected circumstances arise; however, anytime you miss your appointment we are unable to provide you appropriate care.  In addition, each appointment missed could have been used to provide care for others.  We ask that you call at least 24 hours in advance to cancel or reschedule an appointment. We would like to take this opportunity to remind you of our policy stating patients who miss THREE appointments without cancelling or rescheduling 24 hours in advance of the appointment may be subject to cancellation of any further visits with our clinic. Please call [phone] to reschedule your appointment. If there are reasons that make it difficult for you to keep the appointments, please call and let us know how we can help. Please understand that medication prescribing will not continue without seeing your provider.        MEDICATION ISSUES:  INSPECT reviewed as expected    Discussed medication options and treatment plan of prescribed medication as well as the risks, benefits, and side effects including potential falls, possible impaired driving and metabolic adversities among others. Patient is  agreeable to call the office with any worsening of symptoms or onset of side effects. Patient is agreeable to call 911 or go to the nearest ER should he/she begin having SI/HI. No medication side effects or related complaints today.     MEDS ORDERED DURING VISIT:  No orders of the defined types were placed in this encounter.      Return in about 1 week (around 1/7/2025).         This document has been electronically signed by LOBO Vance  December 31, 2024 11:21 EST    Part of this note may be an electronic transcription/translation of spoken language to printed text using the Dragon Dictation System. Some of the data in this electronic note has been brought forward from a previous encounter, any necessary changes have been made, it has been reviewed by this APRN, and it is accurate.

## 2024-12-31 ENCOUNTER — OFFICE VISIT (OUTPATIENT)
Dept: PSYCHIATRY | Facility: CLINIC | Age: 59
End: 2024-12-31
Payer: OTHER GOVERNMENT

## 2024-12-31 DIAGNOSIS — F33.2 SEVERE EPISODE OF RECURRENT MAJOR DEPRESSIVE DISORDER, WITHOUT PSYCHOTIC FEATURES: Primary | Chronic | ICD-10-CM

## 2024-12-31 NOTE — PROGRESS NOTES
Spravato Monitoring Note    Start time of observation: 804  BP check prior to administration: 112/72  O2-98    Rajendra Hernandez   presented 12/31/2024  for clinical monitoring of self administration of Spravato. The patient used a total of 3 devices, self-administered intranasally, with a 5-minute rest between each device.    Each device was checked by [facility] for appropriate expiration and that 2 green indicator dots were present prior to patient administration. Afterwards, the device was checked by [facility] to ensure the green indicator dots were gone and the full medication was delivered.    Patient check at 844 with BP reading of 128/73  O2-98  Patient check at 1004 with BP reading of 119/84  O2-97    End time of observation: 1004    Patient monitored for 2 hours Patient tolerated the procedure well without complications..       NDC 01402-417-79  LOT [id number]  EXP 04/01/2027

## 2025-01-07 NOTE — PROGRESS NOTES
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 10/21/2024 Negative  CUTOFF:200 ng/mL Final    METHYLPHENIDATE IA 10/21/2024 Negative  CUTOFF:100 ng/mL Final    Barbiturates, IA 10/21/2024 Negative  CUTOFF:200 ng/mL Final    PHENCYCLIDINE, IA 10/21/2024 Negative  CUTOFF:25 ng/mL Final    ANTICONVULSANTS 10/21/2024 Negative   Final    Pregabalin 10/21/2024 Not Detected   Final    Gabapentin, IA 10/21/2024 Negative  CUTOFF:1.0 ug/mL Final    Carisoprodol, IA 10/21/2024 Negative  CUTOFF:100 ng/mL Final    Antidepressants 10/21/2024 +POSITIVE+   Final    Amitriptyline 10/21/2024 Not Detected   Final    Amoxapine 10/21/2024 Not Detected   Final    8-Hydroxyamoxapine, Ur 10/21/2024 Not Detected   Final    Bupropion, Ur 10/21/2024 Not Detected   Final    Hydroxybupropion 10/21/2024 Not Detected   Final    Citalopram 10/21/2024 Not Detected   Final    Desmethylcitalopram 10/21/2024 Not Detected   Final    Clomipramine, Ur 10/21/2024 Not Detected   Final    Desmethylclomipramine 10/21/2024 Not Detected   Final    Desipramine 10/21/2024 Not Detected   Final    Doxepin 10/21/2024 Not Detected   Final    Desmethyldoxepin, Ur 10/21/2024 Not  Detected   Final    Duloxetine, Ur 10/21/2024 Not Detected   Final    Fluoxetine, Ur 10/21/2024 Not Detected   Final    Norfluoxetine, Ur 10/21/2024 Not Detected   Final    Fluvoxamine 10/21/2024 Not Detected   Final    Imipramine 10/21/2024 Not Detected   Final    Mirtazapine 10/21/2024 Not Detected   Final    Nortriptyline 10/21/2024 Not Detected   Final    Paroxetine 10/21/2024 Not Detected   Final    Protriptyline 10/21/2024 Not Detected   Final    Sertraline, Ur 10/21/2024 Not Detected   Final    Desmethylsertraline 10/21/2024 Not Detected   Final    Maprotiline 10/21/2024 Not Detected   Final    Nefazodone, Ur 10/21/2024 Not Detected   Final    Trazodone 10/21/2024 PRESENT   Final    1,3 chlorophenyl piperazine 10/21/2024 PRESENT   Final    Trimipramine 10/21/2024 Not Detected   Final    Venlafaxine 10/21/2024 PRESENT   Final    Desmethylvenlafaxine, Ur 10/21/2024 PRESENT   Final    Vilazodone, Ur 10/21/2024 Not Detected   Final    Antipsychotics, Ur 10/21/2024 Negative   Final    Chlorpromazine 10/21/2024 Not Detected   Final    Clozapine, Ur 10/21/2024 Not Detected   Final    Desmethylclozapine, Ur 10/21/2024 Not Detected   Final    Loxapine, Ur 10/21/2024 Not Detected   Final    8-Hydroxyloxapine 10/21/2024 Not Detected   Final    Mesoridazine 10/21/2024 Not Detected   Final    Olanzapine 10/21/2024 Not Detected   Final    Quetiapine 10/21/2024 Not Detected   Final    Risperidone 10/21/2024 Not Detected   Final    Fluphenazine 10/21/2024 Not Detected   Final    Haloperidol 10/21/2024 Not Detected   Final    THIORIDAZINE, UR 10/21/2024 Not Detected   Final    Molindone, Ur 10/21/2024 Not Detected   Final    Pimozide, Ur 10/21/2024 Not Detected   Final    Prochlorperazine, Ur 10/21/2024 Not Detected   Final    Thiothixene 10/21/2024 Not Detected   Final    Trifluoperazine 10/21/2024 Not Detected   Final    Ziprasidone 10/21/2024 Not Detected   Final    Perphenazine, Ur 10/21/2024 Not Detected   Final     Aripiprazole 10/21/2024 Not Detected   Final    Asenapine 10/21/2024 Not Detected   Final    Iloperidone 10/21/2024 Not Detected   Final    Lurasidone 10/21/2024 Not Detected   Final    KRATOM IA 10/21/2024 Negative  CUTOFF:5.0 ng/mL Final    Amphetamines Confirmation 10/21/2024 Negative   Final    METHAMPHETAMINE 10/21/2024 Not Detected  ng/mg creat Final    AMPHETAMINE 10/21/2024 Not Detected  ng/mg creat Final    MDMA-Ecstasy 10/21/2024 Not Detected  ng/mg creat Final    MDA 10/21/2024 Not Detected  ng/mg creat Final    Cannabinoid Confirmation 10/21/2024 +POSITIVE+   Final    Carboxy THC 10/21/2024 95  ng/mg creat Final    Comment: This test is not intended to distinguish between the metabolites of  delta-9-tetrahydrocannabinol, the predominant form of THC in most  herbal or marijuana-based products, and delta-8-tetrahydrocannabinol,  a psychoactive compound generally synthesized from other  cannabinoids.         Assessment & Plan   Diagnoses and all orders for this visit:    1. Severe episode of recurrent major depressive disorder, without psychotic features (Primary)      Continue Spravato 84mg. One time weekly started on 12/18/24. Complete one more session in 1 week, then move to every 2 weeks.     Patient will continue Effexor 75mg daily, buspirone 7.5mg daily, trazodone 100mg nightly, [facility].       Visit Diagnoses:    ICD-10-CM ICD-9-CM   1. Severe episode of recurrent major depressive disorder, without psychotic features  F33.2 296.33     Pt history, review of systems, medications, allergies, reviewed, patient was screened today for depression/anxiety, PHQ/SIXTO scores reviewed.  Most recent vitals/labs reviewed.  Pt was given appropriate time to ask questions and concerns were addressed. A thorough discussion was had that included review of disease process, need for continued monitoring and additional treatment options including use of pharmacological and non-pharmacological approaches to  care, decisions were made and agreed upon by patient and provider.   Discussed the risks, benefits, and potential side effects of the medications; patient ackowledged and verbally consented.     TREATMENT PLAN/GOALS: Continue supportive psychotherapy efforts and medications as indicated. Treatment and medication options discussed during today's visit. Patient ackowledged and verbally consented to continue with current treatment plan and was educated on the importance of compliance with treatment and follow-up appointments.    -Short-Term Goals: Patient will be compliant with medication management and note improvement in S/S over the next 4 to 6 weeks or at next scheduled visit.  -Long-Term Goals: Patient will be compliant with the agreed treatment plan including medication regimen & F/U appt's and deny impairment in daily functioning over the next 6 months.      CRISIS RESOURCES:    In the event you have personal crisis, there are several resources to reach someone to talk with:    968 Suicide and Crisis Lifeline  Call or text 278 or chat 988lifeline.org  [facility]'s National Helpline  0-903-484-HELP (4357)  Text your zip code to [address] (HELP4U)  [place]'s Crisis Line  Dial 988, then press 1  Text 263471    No show policy:  We understand unexpected circumstances arise; however, anytime you miss your appointment we are unable to provide you appropriate care.  In addition, each appointment missed could have been used to provide care for others.  We ask that you call at least 24 hours in advance to cancel or reschedule an appointment. We would like to take this opportunity to remind you of our policy stating patients who miss THREE appointments without cancelling or rescheduling 24 hours in advance of the appointment may be subject to cancellation of any further visits with our clinic. Please call [phone] to reschedule your appointment. If there are reasons that make it difficult for you to keep the appointments,  please call and let us know how we can help. Please understand that medication prescribing will not continue without seeing your provider.        MEDICATION ISSUES:  INSPECT reviewed as expected    Discussed medication options and treatment plan of prescribed medication as well as the risks, benefits, and side effects including potential falls, possible impaired driving and metabolic adversities among others. Patient is agreeable to call the office with any worsening of symptoms or onset of side effects. Patient is agreeable to call 911 or go to the nearest ER should he/she begin having SI/HI. No medication side effects or related complaints today.     MEDS ORDERED DURING VISIT:  No orders of the defined types were placed in this encounter.      No follow-ups on file.         This document has been electronically signed by LOBO Vance  January 8, 2025 10:31 EST    Part of this note may be an electronic transcription/translation of spoken language to printed text using the Dragon Dictation System.     Some of the data in this electronic note has been brought forward from a previous encounter, any necessary changes have been made, it has been reviewed by this APRN, and it is accurate.

## 2025-01-08 ENCOUNTER — OFFICE VISIT (OUTPATIENT)
Dept: PSYCHIATRY | Facility: CLINIC | Age: 60
End: 2025-01-08
Payer: OTHER GOVERNMENT

## 2025-01-08 DIAGNOSIS — F33.2 SEVERE EPISODE OF RECURRENT MAJOR DEPRESSIVE DISORDER, WITHOUT PSYCHOTIC FEATURES: Primary | Chronic | ICD-10-CM

## 2025-01-08 NOTE — PROGRESS NOTES
Spravato Monitoring Note    Start time of observation: 804  BP check prior to administration: 122/75  O2-98    Rajenrda Haydenreuben   presented 01/08/2025  for clinical monitoring of self administration of Spravato. The patient used a total of 3 devices, self-administered intranasally, with a 5-minute rest between each device.    Each device was checked by [facility] for appropriate expiration and that 2 green indicator dots were present prior to patient administration. Afterwards, the device was checked by J[facility] to ensure the green indicator dots were gone and the full medication was delivered.    Patient check at 844 with BP reading of 119/74  O2-98  Patient check at 1004 with BP reading of 126/80  O2-98    End time of observation: 1004    Patient monitored for 2 hours Patient tolerated the procedure well without complications..       NDC [id number]  LOT [id number]  EXP 04/01/2027

## 2025-01-14 NOTE — PROGRESS NOTES
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 Final    Further testing indicated    6-Acetylmorphine IA 10/21/2024 Negative  CUTOFF:10 ng/mL Final    Opiate Class IA 10/21/2024 Negative  CUTOFF:100 ng/mL Final    Oxycodone Class IA 10/21/2024 Negative  CUTOFF:100 ng/mL Final    METHADONE, IA 10/21/2024 Negative  CUTOFF:100 ng/mL Final    Methadone MTB IA 10/21/2024 Negative  CUTOFF:100 ng/mL Final    BUPRENORPHINE IA 10/21/2024 Negative  CUTOFF:5.0 ng/mL Final    FENTANYL, IA 10/21/2024 Negative  CUTOFF:2.0 ng/mL Final    Tapentadol, IA 10/21/2024 Negative  CUTOFF:200 ng/mL Final    PROPOXYPHENE, IA 10/21/2024 Negative  CUTOFF:300 ng/mL Final    TRAMADOL, IA 10/21/2024 Negative  CUTOFF:200 ng/mL Final    METHYLPHENIDATE IA 10/21/2024 Negative  CUTOFF:100 ng/mL Final    Barbiturates, IA 10/21/2024 Negative  CUTOFF:200 ng/mL Final    PHENCYCLIDINE, IA 10/21/2024 Negative  CUTOFF:25 ng/mL Final    ANTICONVULSANTS 10/21/2024 Negative   Final    Pregabalin 10/21/2024 Not Detected   Final    Gabapentin, IA 10/21/2024 Negative  CUTOFF:1.0 ug/mL Final    Carisoprodol, IA 10/21/2024 Negative  CUTOFF:100 ng/mL Final    Antidepressants 10/21/2024  +POSITIVE+   Final    Amitriptyline 10/21/2024 Not Detected   Final    Amoxapine 10/21/2024 Not Detected   Final    8-Hydroxyamoxapine, Ur 10/21/2024 Not Detected   Final    Bupropion, Ur 10/21/2024 Not Detected   Final    Hydroxybupropion 10/21/2024 Not Detected   Final    Citalopram 10/21/2024 Not Detected   Final    Desmethylcitalopram 10/21/2024 Not Detected   Final    Clomipramine, Ur 10/21/2024 Not Detected   Final    Desmethylclomipramine 10/21/2024 Not Detected   Final    Desipramine 10/21/2024 Not Detected   Final    Doxepin 10/21/2024 Not Detected   Final    Desmethyldoxepin, Ur 10/21/2024 Not Detected   Final    Duloxetine, Ur 10/21/2024 Not Detected   Final    Fluoxetine, Ur 10/21/2024 Not Detected   Final    Norfluoxetine, Ur 10/21/2024 Not Detected   Final    Fluvoxamine 10/21/2024 Not Detected   Final    Imipramine 10/21/2024 Not Detected   Final    Mirtazapine 10/21/2024 Not Detected   Final    Nortriptyline 10/21/2024 Not Detected   Final    Paroxetine 10/21/2024 Not Detected   Final    Protriptyline 10/21/2024 Not Detected   Final    Sertraline, Ur 10/21/2024 Not Detected   Final    Desmethylsertraline 10/21/2024 Not Detected   Final    Maprotiline 10/21/2024 Not Detected   Final    Nefazodone, Ur 10/21/2024 Not Detected   Final    Trazodone 10/21/2024 PRESENT   Final    1,3 chlorophenyl piperazine 10/21/2024 PRESENT   Final    Trimipramine 10/21/2024 Not Detected   Final    Venlafaxine 10/21/2024 PRESENT   Final    Desmethylvenlafaxine, Ur 10/21/2024 PRESENT   Final    Vilazodone, Ur 10/21/2024 Not Detected   Final    Antipsychotics, Ur 10/21/2024 Negative   Final    Chlorpromazine 10/21/2024 Not Detected   Final    Clozapine, Ur 10/21/2024 Not Detected   Final    Desmethylclozapine, Ur 10/21/2024 Not Detected   Final    Loxapine, Ur 10/21/2024 Not Detected   Final    8-Hydroxyloxapine 10/21/2024 Not Detected   Final    Mesoridazine 10/21/2024 Not Detected   Final    Olanzapine 10/21/2024 Not  Detected   Final    Quetiapine 10/21/2024 Not Detected   Final    Risperidone 10/21/2024 Not Detected   Final    Fluphenazine 10/21/2024 Not Detected   Final    Haloperidol 10/21/2024 Not Detected   Final    THIORIDAZINE, UR 10/21/2024 Not Detected   Final    Molindone, Ur 10/21/2024 Not Detected   Final    Pimozide, Ur 10/21/2024 Not Detected   Final    Prochlorperazine, Ur 10/21/2024 Not Detected   Final    Thiothixene 10/21/2024 Not Detected   Final    Trifluoperazine 10/21/2024 Not Detected   Final    Ziprasidone 10/21/2024 Not Detected   Final    Perphenazine, Ur 10/21/2024 Not Detected   Final    Aripiprazole 10/21/2024 Not Detected   Final    Asenapine 10/21/2024 Not Detected   Final    Iloperidone 10/21/2024 Not Detected   Final    Lurasidone 10/21/2024 Not Detected   Final    KRATOM IA 10/21/2024 Negative  CUTOFF:5.0 ng/mL Final    Amphetamines Confirmation 10/21/2024 Negative   Final    METHAMPHETAMINE 10/21/2024 Not Detected  ng/mg creat Final    AMPHETAMINE 10/21/2024 Not Detected  ng/mg creat Final    MDMA-Ecstasy 10/21/2024 Not Detected  ng/mg creat Final    MDA 10/21/2024 Not Detected  ng/mg creat Final    Cannabinoid Confirmation 10/21/2024 +POSITIVE+   Final    Carboxy THC 10/21/2024 95  ng/mg creat Final    Comment: This test is not intended to distinguish between the metabolites of  delta-9-tetrahydrocannabinol, the predominant form of THC in most  herbal or marijuana-based products, and delta-8-tetrahydrocannabinol,  a psychoactive compound generally synthesized from other  cannabinoids.         Assessment & Plan   Diagnoses and all orders for this visit:    1. Severe episode of recurrent major depressive disorder, without psychotic features (Primary)        Continue Spravato 84mg. Advance to every 2 week sessions.     Patient will continue Effexor 75mg daily, buspirone 7.5mg daily, trazodone 100mg nightly, [facility].       Visit Diagnoses:    ICD-10-CM ICD-9-CM   1. Severe episode  of recurrent major depressive disorder, without psychotic features  F33.2 296.33       Pt history, review of systems, medications, allergies, reviewed, patient was screened today for depression/anxiety, PHQ/SIXTO scores reviewed.  Most recent vitals/labs reviewed.  Pt was given appropriate time to ask questions and concerns were addressed. A thorough discussion was had that included review of disease process, need for continued monitoring and additional treatment options including use of pharmacological and non-pharmacological approaches to care, decisions were made and agreed upon by patient and provider.   Discussed the risks, benefits, and potential side effects of the medications; patient ackowledged and verbally consented.     TREATMENT PLAN/GOALS: Continue supportive psychotherapy efforts and medications as indicated. Treatment and medication options discussed during today's visit. Patient ackowledged and verbally consented to continue with current treatment plan and was educated on the importance of compliance with treatment and follow-up appointments.    -Short-Term Goals: Patient will be compliant with medication management and note improvement in S/S over the next 4 to 6 weeks or at next scheduled visit.  -Long-Term Goals: Patient will be compliant with the agreed treatment plan including medication regimen & F/U appt's and deny impairment in daily functioning over the next 6 months.      CRISIS RESOURCES:    In the event you have personal crisis, there are several resources to reach someone to talk with:    988 Suicide and Crisis Lifeline  Call or text 588 or chat [employer]88[employer] Picturesline[employer]org  [facility]'s National Helpline  9-368-191-HELP (4357)  Text your zip code to 913969 (HELP4U)  [facility] Crisis Line  Dial 156, then press 1  Text 369715    No show policy:  We understand unexpected circumstances arise; however, anytime you miss your appointment we are unable to provide you appropriate care.  In addition, each  appointment missed could have been used to provide care for others.  We ask that you call at least 24 hours in advance to cancel or reschedule an appointment. We would like to take this opportunity to remind you of our policy stating patients who miss THREE appointments without cancelling or rescheduling 24 hours in advance of the appointment may be subject to cancellation of any further visits with our clinic. Please call [phone] to reschedule your appointment. If there are reasons that make it difficult for you to keep the appointments, please call and let us know how we can help. Please understand that medication prescribing will not continue without seeing your provider.        MEDICATION ISSUES:  INSPECT reviewed as expected    Discussed medication options and treatment plan of prescribed medication as well as the risks, benefits, and side effects including potential falls, possible impaired driving and metabolic adversities among others. Patient is agreeable to call the office with any worsening of symptoms or onset of side effects. Patient is agreeable to call 911 or go to the nearest ER should he/she begin having SI/HI. No medication side effects or related complaints today.     MEDS ORDERED DURING VISIT:  No orders of the defined types were placed in this encounter.      Return in about 2 weeks (around 1/29/2025).         This document has been electronically signed by LOBO Vacne  January 15, 2025 08:19 EST    Part of this note may be an electronic transcription/translation of spoken language to printed text using the Dragon Dictation System.     Some of the data in this electronic note has been brought forward from a previous encounter, any necessary changes have been made, it has been reviewed by this APRN, and it is accurate.

## 2025-01-15 ENCOUNTER — OFFICE VISIT (OUTPATIENT)
Dept: PSYCHIATRY | Facility: CLINIC | Age: 60
End: 2025-01-15
Payer: OTHER GOVERNMENT

## 2025-01-15 DIAGNOSIS — F33.2 SEVERE EPISODE OF RECURRENT MAJOR DEPRESSIVE DISORDER, WITHOUT PSYCHOTIC FEATURES: Primary | Chronic | ICD-10-CM

## 2025-01-15 NOTE — PROGRESS NOTES
Spravato Monitoring Note    Start time of observation: 800  BP check prior to administration: 120/78  O2-98    Rajendra Haydenreuben   presented 01/15/2025  for clinical monitoring of self administration of Spravato. The patient used a total of 3 devices, self-administered intranasally, with a 5-minute rest between each device.    Each device was checked by [facility] for appropriate expiration and that 2 green indicator dots were present prior to patient administration. Afterwards, the device was checked by [facility] to ensure the green indicator dots were gone and the full medication was delivered.    Patient check at 840 with BP reading of 133/80  O2-97  Patient check at 1000 with BP reading of 129/68  O2-98    End time of observation: 1000    Patient monitored for 2 hours Patient tolerated the procedure well without complications..       NDC 92605-613-91  LOT [id number]  EXP 05/01/2027

## 2025-01-28 NOTE — PROGRESS NOTES
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 metabolism of benzodiazepine class drugs:   Parent Drug       Detected Metabolites   -----------       --------------------   Diazepam:         Desmethyldiazepam, Temazepam, Oxazepam   Chlordiazepoxide: Desmethyldiazepam, Oxazepam   Clorazepate:      Desmethyldiazepam, Oxazepam   Halazepam:        Desmethyldiazepam, Oxazepam   Temazepam:        Oxazepam   Oxazepam:         None      Alprazolam Urine, Conf 10/21/2024 Not Detected  ng/mg creat Final    Alpha-hydroxyalprazolam, Urine 10/21/2024 Not Detected  ng/mg creat Final    Desalkylflurazepam, Urine 10/21/2024 Not Detected  ng/mg creat Final    Lorazepam, Urine 10/21/2024 Not Detected  ng/mg creat Final    Alpha-hydroxytriazolam, Urine 10/21/2024 Not Detected  ng/mg creat Final    Clonazepam 10/21/2024 Not Detected  ng/mg creat Final    7- AMINOCLONAZEPAM 10/21/2024 Not Detected  ng/mg creat Final    Midazolam, Urine 10/21/2024 Not Detected  ng/mg creat Final    Alpha-hydroxymidazolam, Urine 10/21/2024 Not Detected  ng/mg creat Final    Flunitrazepam 10/21/2024 Not Detected  ng/mg creat Final    DESMETHYLFLUNITRAZEPAM 10/21/2024 Not Detected   ng/mg creat Final    COCAINE / METABOLITE, IA 10/21/2024 Negative  CUTOFF:150 ng/mL Final    Ethanol and Ethanol Biomarkers 10/21/2024 Negative  CUTOFF:500 ng/mL Final    Cannavinoids IA 10/21/2024 Comment  CUTOFF:20 ng/mL Final    Further testing indicated    6-Acetylmorphine IA 10/21/2024 Negative  CUTOFF:10 ng/mL Final    Opiate Class IA 10/21/2024 Negative  CUTOFF:100 ng/mL Final    Oxycodone Class IA 10/21/2024 Negative  CUTOFF:100 ng/mL Final    METHADONE, IA 10/21/2024 Negative  CUTOFF:100 ng/mL Final    Methadone MTB IA 10/21/2024 Negative  CUTOFF:100 ng/mL Final    BUPRENORPHINE IA 10/21/2024 Negative  CUTOFF:5.0 ng/mL Final    FENTANYL, IA 10/21/2024 Negative  CUTOFF:2.0 ng/mL Final    Tapentadol, IA 10/21/2024 Negative  CUTOFF:200 ng/mL Final    PROPOXYPHENE, IA 10/21/2024 Negative  CUTOFF:300 ng/mL Final    TRAMADOL, IA 10/21/2024 Negative  CUTOFF:200 ng/mL Final    METHYLPHENIDATE IA 10/21/2024 Negative  CUTOFF:100 ng/mL Final    Barbiturates, IA 10/21/2024 Negative  CUTOFF:200 ng/mL Final    PHENCYCLIDINE, IA 10/21/2024 Negative  CUTOFF:25 ng/mL Final    ANTICONVULSANTS 10/21/2024 Negative   Final    Pregabalin 10/21/2024 Not Detected   Final    Gabapentin, IA 10/21/2024 Negative  CUTOFF:1.0 ug/mL Final    Carisoprodol, IA 10/21/2024 Negative  CUTOFF:100 ng/mL Final    Antidepressants 10/21/2024 +POSITIVE+   Final    Amitriptyline 10/21/2024 Not Detected   Final    Amoxapine 10/21/2024 Not Detected   Final    8-Hydroxyamoxapine, Ur 10/21/2024 Not Detected   Final    Bupropion, Ur 10/21/2024 Not Detected   Final    Hydroxybupropion 10/21/2024 Not Detected   Final    Citalopram 10/21/2024 Not Detected   Final    Desmethylcitalopram 10/21/2024 Not Detected   Final    Clomipramine, Ur 10/21/2024 Not Detected   Final    Desmethylclomipramine 10/21/2024 Not Detected   Final    Desipramine 10/21/2024 Not Detected   Final    Doxepin 10/21/2024 Not Detected   Final    Desmethyldoxepin, Ur 10/21/2024 Not  Detected   Final    Duloxetine, Ur 10/21/2024 Not Detected   Final    Fluoxetine, Ur 10/21/2024 Not Detected   Final    Norfluoxetine, Ur 10/21/2024 Not Detected   Final    Fluvoxamine 10/21/2024 Not Detected   Final    Imipramine 10/21/2024 Not Detected   Final    Mirtazapine 10/21/2024 Not Detected   Final    Nortriptyline 10/21/2024 Not Detected   Final    Paroxetine 10/21/2024 Not Detected   Final    Protriptyline 10/21/2024 Not Detected   Final    Sertraline, Ur 10/21/2024 Not Detected   Final    Desmethylsertraline 10/21/2024 Not Detected   Final    Maprotiline 10/21/2024 Not Detected   Final    Nefazodone, Ur 10/21/2024 Not Detected   Final    Trazodone 10/21/2024 PRESENT   Final    1,3 chlorophenyl piperazine 10/21/2024 PRESENT   Final    Trimipramine 10/21/2024 Not Detected   Final    Venlafaxine 10/21/2024 PRESENT   Final    Desmethylvenlafaxine, Ur 10/21/2024 PRESENT   Final    Vilazodone, Ur 10/21/2024 Not Detected   Final    Antipsychotics, Ur 10/21/2024 Negative   Final    Chlorpromazine 10/21/2024 Not Detected   Final    Clozapine, Ur 10/21/2024 Not Detected   Final    Desmethylclozapine, Ur 10/21/2024 Not Detected   Final    Loxapine, Ur 10/21/2024 Not Detected   Final    8-Hydroxyloxapine 10/21/2024 Not Detected   Final    Mesoridazine 10/21/2024 Not Detected   Final    Olanzapine 10/21/2024 Not Detected   Final    Quetiapine 10/21/2024 Not Detected   Final    Risperidone 10/21/2024 Not Detected   Final    Fluphenazine 10/21/2024 Not Detected   Final    Haloperidol 10/21/2024 Not Detected   Final    THIORIDAZINE, UR 10/21/2024 Not Detected   Final    Molindone, Ur 10/21/2024 Not Detected   Final    Pimozide, Ur 10/21/2024 Not Detected   Final    Prochlorperazine, Ur 10/21/2024 Not Detected   Final    Thiothixene 10/21/2024 Not Detected   Final    Trifluoperazine 10/21/2024 Not Detected   Final    Ziprasidone 10/21/2024 Not Detected   Final    Perphenazine, Ur 10/21/2024 Not Detected   Final     Aripiprazole 10/21/2024 Not Detected   Final    Asenapine 10/21/2024 Not Detected   Final    Iloperidone 10/21/2024 Not Detected   Final    Lurasidone 10/21/2024 Not Detected   Final    KRATOM IA 10/21/2024 Negative  CUTOFF:5.0 ng/mL Final    Amphetamines Confirmation 10/21/2024 Negative   Final    METHAMPHETAMINE 10/21/2024 Not Detected  ng/mg creat Final    AMPHETAMINE 10/21/2024 Not Detected  ng/mg creat Final    MDMA-Ecstasy 10/21/2024 Not Detected  ng/mg creat Final    MDA 10/21/2024 Not Detected  ng/mg creat Final    Cannabinoid Confirmation 10/21/2024 +POSITIVE+   Final    Carboxy THC 10/21/2024 95  ng/mg creat Final    Comment: This test is not intended to distinguish between the metabolites of  delta-9-tetrahydrocannabinol, the predominant form of THC in most  herbal or marijuana-based products, and delta-8-tetrahydrocannabinol,  a psychoactive compound generally synthesized from other  cannabinoids.         Assessment & Plan   Diagnoses and all orders for this visit:    1. Severe episode of recurrent major depressive disorder, without psychotic features (Primary)        Assessment & Plan  1. Major depressive disorder, severe  Continue Spravato 84mg every 2 week sessions.   Patient will continue Effexor 75mg daily, buspirone 7.5mg daily, trazodone 100mg nightly, [facility].   His depressive symptoms have shown significant improvement, which he attributes to the therapeutic benefits of EMDR sessions, combined with Spravato therapy, family support, and [facility]. He is advised to continue his current treatment regimen, including EMDR therapy and maintaining his support system. Continue bi-weekly Spravato sessions.     Follow-up  The patient will follow up in 2 weeks.        Visit Diagnoses:    ICD-10-CM ICD-9-CM   1. Severe episode of recurrent major depressive disorder, without psychotic features  F33.2 296.33         Pt history, review of systems, medications,  allergies, reviewed, patient was screened today for depression/anxiety, PHQ/SIXTO scores reviewed.  Most recent vitals/labs reviewed.  Pt was given appropriate time to ask questions and concerns were addressed. A thorough discussion was had that included review of disease process, need for continued monitoring and additional treatment options including use of pharmacological and non-pharmacological approaches to care, decisions were made and agreed upon by patient and provider.   Discussed the risks, benefits, and potential side effects of the medications; patient ackowledged and verbally consented.     TREATMENT PLAN/GOALS: Continue supportive psychotherapy efforts and medications as indicated. Treatment and medication options discussed during today's visit. Patient ackowledged and verbally consented to continue with current treatment plan and was educated on the importance of compliance with treatment and follow-up appointments.    -Short-Term Goals: Patient will be compliant with medication management and note improvement in S/S over the next 4 to 6 weeks or at next scheduled visit.  -Long-Term Goals: Patient will be compliant with the agreed treatment plan including medication regimen & F/U appt's and deny impairment in daily functioning over the next 6 months.      CRISIS RESOURCES:    In the event you have personal crisis, there are several resources to reach someone to talk with:    988 Suicide and Crisis Lifeline  Call or text 988 or chat [employer]  [facility]'s National Helpline  1-197-440-HELP (4357)  Text your zip code to 543608 (HELP4U)  's Crisis Line  Dial 988, then press 1  Text 758039    No show policy:  We understand unexpected circumstances arise; however, anytime you miss your appointment we are unable to provide you appropriate care.  In addition, each appointment missed could have been used to provide care for others.  We ask that you call at least 24 hours in advance to cancel or  reschedule an appointment. We would like to take this opportunity to remind you of our policy stating patients who miss THREE appointments without cancelling or rescheduling 24 hours in advance of the appointment may be subject to cancellation of any further visits with our clinic. Please call [phone] to reschedule your appointment. If there are reasons that make it difficult for you to keep the appointments, please call and let us know how we can help. Please understand that medication prescribing will not continue without seeing your provider.        MEDICATION ISSUES:  INSPECT reviewed as expected    Discussed medication options and treatment plan of prescribed medication as well as the risks, benefits, and side effects including potential falls, possible impaired driving and metabolic adversities among others. Patient is agreeable to call the office with any worsening of symptoms or onset of side effects. Patient is agreeable to call 911 or go to the nearest ER should he/she begin having SI/HI. No medication side effects or related complaints today.     MEDS ORDERED DURING VISIT:  No orders of the defined types were placed in this encounter.      Return in about 2 weeks (around 2/12/2025).         This document has been electronically signed by LOBO Vance  January 29, 2025 08:37 EST    Part of this note may be an electronic transcription/translation of spoken language to printed text using the Dragon Dictation System.     Some of the data in this electronic note has been brought forward from a previous encounter, any necessary changes have been made, it has been reviewed by this APRN, and it is accurate.

## 2025-01-29 ENCOUNTER — OFFICE VISIT (OUTPATIENT)
Dept: PSYCHIATRY | Facility: CLINIC | Age: 60
End: 2025-01-29
Payer: OTHER GOVERNMENT

## 2025-01-29 DIAGNOSIS — F33.2 SEVERE EPISODE OF RECURRENT MAJOR DEPRESSIVE DISORDER, WITHOUT PSYCHOTIC FEATURES: Primary | Chronic | ICD-10-CM

## 2025-01-29 NOTE — PROGRESS NOTES
Spravato Monitoring Note    Start time of observation: 805  BP check prior to administration: 113/78  O2-95    Rajendra Haydenreuben   presented 01/29/2025  for clinical monitoring of self administration of Spravato. The patient used a total of 3 devices, self-administered intranasally, with a 5-minute rest between each device.    Each device was checked by [facility] for appropriate expiration and that 2 green indicator dots were present prior to patient administration. Afterwards, the device was checked by J[facility] to ensure the green indicator dots were gone and the full medication was delivered.    Patient check at 845 with BP reading of 116/74  O2-95  Patient check at 1005 with BP reading of 118/76  O2-97    End time of observation: 1005    Patient monitored for 2 hours Patient tolerated the procedure well without complications..       NDC 36639-013-09  LOT [id number]  EXP 05/01/2027

## 2025-02-10 NOTE — PROGRESS NOTES
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   Nicotine   Omeprazole   Rosuvastatin   Sacubitril (Entresto)   Spironolactone   Sumatriptan   Tenofovir (Biktarvy)   Topical Diclofenac   Valsartan (Entresto)   Vitamin B12  ====================================================================  For clinical consultation, please call [phone].  ====================================================================      CREATININE 10/21/2024 109  mg/dL Final    REFERENCE RANGE: Ref Range>=20    Amphetamines, IA 10/21/2024 Comment  CUTOFF:300 ng/mL Final    Further testing indicated    Benzodiazepines 10/21/2024 Negative   Final    Diazepam Urine, Qualitative 10/21/2024 Not Detected  ng/mg creat Final    Desmethyldiazepam 10/21/2024 Not Detected  ng/mg creat Final    Oxazepam, urine 10/21/2024 Not Detected  ng/mg creat Final    Temazepam 10/21/2024 Not Detected  ng/mg creat Final    Comment: Expected metabolism of benzodiazepine class drugs:   Parent Drug       Detected Metabolites   -----------       --------------------   Diazepam:          Desmethyldiazepam, Temazepam, Oxazepam   Chlordiazepoxide: Desmethyldiazepam, Oxazepam   Clorazepate:      Desmethyldiazepam, Oxazepam   Halazepam:        Desmethyldiazepam, Oxazepam   Temazepam:        Oxazepam   Oxazepam:         None      Alprazolam Urine, Conf 10/21/2024 Not Detected  ng/mg creat Final    Alpha-hydroxyalprazolam, Urine 10/21/2024 Not Detected  ng/mg creat Final    Desalkylflurazepam, Urine 10/21/2024 Not Detected  ng/mg creat Final    Lorazepam, Urine 10/21/2024 Not Detected  ng/mg creat Final    Alpha-hydroxytriazolam, Urine 10/21/2024 Not Detected  ng/mg creat Final    Clonazepam 10/21/2024 Not Detected  ng/mg creat Final    7- AMINOCLONAZEPAM 10/21/2024 Not Detected  ng/mg creat Final    Midazolam, Urine 10/21/2024 Not Detected  ng/mg creat Final    Alpha-hydroxymidazolam, Urine 10/21/2024 Not Detected  ng/mg creat Final    Flunitrazepam 10/21/2024 Not Detected  ng/mg creat Final    DESMETHYLFLUNITRAZEPAM 10/21/2024 Not Detected  ng/mg creat Final    COCAINE / METABOLITE, IA 10/21/2024 Negative  CUTOFF:150 ng/mL Final    Ethanol and Ethanol Biomarkers 10/21/2024 Negative  CUTOFF:500 ng/mL Final    Cannavinoids IA 10/21/2024 Comment  CUTOFF:20 ng/mL Final    Further testing indicated    6-Acetylmorphine IA 10/21/2024 Negative  CUTOFF:10 ng/mL Final    Opiate Class IA 10/21/2024 Negative  CUTOFF:100 ng/mL Final    Oxycodone Class IA 10/21/2024 Negative  CUTOFF:100 ng/mL Final    METHADONE, IA 10/21/2024 Negative  CUTOFF:100 ng/mL Final    Methadone MTB IA 10/21/2024 Negative  CUTOFF:100 ng/mL Final    BUPRENORPHINE IA 10/21/2024 Negative  CUTOFF:5.0 ng/mL Final    FENTANYL, IA 10/21/2024 Negative  CUTOFF:2.0 ng/mL Final    Tapentadol, IA 10/21/2024 Negative  CUTOFF:200 ng/mL Final    PROPOXYPHENE, IA 10/21/2024 Negative  CUTOFF:300 ng/mL Final    TRAMADOL, IA 10/21/2024 Negative  CUTOFF:200 ng/mL Final    METHYLPHENIDATE IA 10/21/2024 Negative  CUTOFF:100 ng/mL Final    Barbiturates, IA  10/21/2024 Negative  CUTOFF:200 ng/mL Final    PHENCYCLIDINE, IA 10/21/2024 Negative  CUTOFF:25 ng/mL Final    ANTICONVULSANTS 10/21/2024 Negative   Final    Pregabalin 10/21/2024 Not Detected   Final    Gabapentin, IA 10/21/2024 Negative  CUTOFF:1.0 ug/mL Final    Carisoprodol, IA 10/21/2024 Negative  CUTOFF:100 ng/mL Final    Antidepressants 10/21/2024 +POSITIVE+   Final    Amitriptyline 10/21/2024 Not Detected   Final    Amoxapine 10/21/2024 Not Detected   Final    8-Hydroxyamoxapine, Ur 10/21/2024 Not Detected   Final    Bupropion, Ur 10/21/2024 Not Detected   Final    Hydroxybupropion 10/21/2024 Not Detected   Final    Citalopram 10/21/2024 Not Detected   Final    Desmethylcitalopram 10/21/2024 Not Detected   Final    Clomipramine, Ur 10/21/2024 Not Detected   Final    Desmethylclomipramine 10/21/2024 Not Detected   Final    Desipramine 10/21/2024 Not Detected   Final    Doxepin 10/21/2024 Not Detected   Final    Desmethyldoxepin, Ur 10/21/2024 Not Detected   Final    Duloxetine, Ur 10/21/2024 Not Detected   Final    Fluoxetine, Ur 10/21/2024 Not Detected   Final    Norfluoxetine, Ur 10/21/2024 Not Detected   Final    Fluvoxamine 10/21/2024 Not Detected   Final    Imipramine 10/21/2024 Not Detected   Final    Mirtazapine 10/21/2024 Not Detected   Final    Nortriptyline 10/21/2024 Not Detected   Final    Paroxetine 10/21/2024 Not Detected   Final    Protriptyline 10/21/2024 Not Detected   Final    Sertraline, Ur 10/21/2024 Not Detected   Final    Desmethylsertraline 10/21/2024 Not Detected   Final    Maprotiline 10/21/2024 Not Detected   Final    Nefazodone, Ur 10/21/2024 Not Detected   Final    Trazodone 10/21/2024 PRESENT   Final    1,3 chlorophenyl piperazine 10/21/2024 PRESENT   Final    Trimipramine 10/21/2024 Not Detected   Final    Venlafaxine 10/21/2024 PRESENT   Final    Desmethylvenlafaxine, Ur 10/21/2024 PRESENT   Final    Vilazodone, Ur 10/21/2024 Not Detected   Final    Antipsychotics, Ur 10/21/2024  Negative   Final    Chlorpromazine 10/21/2024 Not Detected   Final    Clozapine, Ur 10/21/2024 Not Detected   Final    Desmethylclozapine, Ur 10/21/2024 Not Detected   Final    Loxapine, Ur 10/21/2024 Not Detected   Final    8-Hydroxyloxapine 10/21/2024 Not Detected   Final    Mesoridazine 10/21/2024 Not Detected   Final    Olanzapine 10/21/2024 Not Detected   Final    Quetiapine 10/21/2024 Not Detected   Final    Risperidone 10/21/2024 Not Detected   Final    Fluphenazine 10/21/2024 Not Detected   Final    Haloperidol 10/21/2024 Not Detected   Final    THIORIDAZINE, UR 10/21/2024 Not Detected   Final    Molindone, Ur 10/21/2024 Not Detected   Final    Pimozide, Ur 10/21/2024 Not Detected   Final    Prochlorperazine, Ur 10/21/2024 Not Detected   Final    Thiothixene 10/21/2024 Not Detected   Final    Trifluoperazine 10/21/2024 Not Detected   Final    Ziprasidone 10/21/2024 Not Detected   Final    Perphenazine, Ur 10/21/2024 Not Detected   Final    Aripiprazole 10/21/2024 Not Detected   Final    Asenapine 10/21/2024 Not Detected   Final    Iloperidone 10/21/2024 Not Detected   Final    Lurasidone 10/21/2024 Not Detected   Final    KRATOM IA 10/21/2024 Negative  CUTOFF:5.0 ng/mL Final    Amphetamines Confirmation 10/21/2024 Negative   Final    METHAMPHETAMINE 10/21/2024 Not Detected  ng/mg creat Final    AMPHETAMINE 10/21/2024 Not Detected  ng/mg creat Final    MDMA-Ecstasy 10/21/2024 Not Detected  ng/mg creat Final    MDA 10/21/2024 Not Detected  ng/mg creat Final    Cannabinoid Confirmation 10/21/2024 +POSITIVE+   Final    Carboxy THC 10/21/2024 95  ng/mg creat Final    Comment: This test is not intended to distinguish between the metabolites of  delta-9-tetrahydrocannabinol, the predominant form of THC in most  herbal or marijuana-based products, and delta-8-tetrahydrocannabinol,  a psychoactive compound generally synthesized from other  cannabinoids.         Assessment & Plan   Diagnoses and all orders for this  visit:    1. Severe episode of recurrent major depressive disorder, without psychotic features (Primary)          Assessment & Plan  1.Major depressive disorder  He is currently receiving Spravato treatment for depression. Patient states he will be moving to [place] near the end of February. He has been advised to utilize the [facility] website to identify a suitable provider in [place] who [place] insurance, and also administers Spravato. An additional appointment will be scheduled for him on 02/19/2025 or 02/20/2025, contingent upon the availability of the schedule.    Continue Spravato 84mg every 2 week sessions.   Patient will continue Effexor 75mg daily, buspirone 7.5mg daily, trazodone 100mg nightly, with [facility] provider.         Visit Diagnoses:    ICD-10-CM ICD-9-CM   1. Severe episode of recurrent major depressive disorder, without psychotic features  F33.2 296.33           Pt history, review of systems, medications, allergies, reviewed, patient was screened today for depression/anxiety, PHQ/SIXTO scores reviewed.  Most recent vitals/labs reviewed.  Pt was given appropriate time to ask questions and concerns were addressed. A thorough discussion was had that included review of disease process, need for continued monitoring and additional treatment options including use of pharmacological and non-pharmacological approaches to care, decisions were made and agreed upon by patient and provider.   Discussed the risks, benefits, and potential side effects of the medications; patient ackowledged and verbally consented.     TREATMENT PLAN/GOALS: Continue supportive psychotherapy efforts and medications as indicated. Treatment and medication options discussed during today's visit. Patient ackowledged and verbally consented to continue with current treatment plan and was educated on the importance of compliance with treatment and follow-up appointments.    -Short-Term Goals: Patient will be compliant with medication  management and note improvement in S/S over the next 4 to 6 weeks or at next scheduled visit.  -Long-Term Goals: Patient will be compliant with the agreed treatment plan including medication regimen & F/U appt's and deny impairment in daily functioning over the next 6 months.      CRISIS RESOURCES:    In the event you have personal crisis, there are several resources to reach someone to talk with:    169 Suicide and Crisis Lifeline  Call or text 987 or chat [employer]  [facility]'s [employer] Helpline  5-351-760-HELP (4357)  Text your zip code to 182671 (HELP4U)  's Crisis Line  Dial 988, then press 1  Text 960719    No show policy:  We understand unexpected circumstances arise; however, anytime you miss your appointment we are unable to provide you appropriate care.  In addition, each appointment missed could have been used to provide care for others.  We ask that you call at least 24 hours in advance to cancel or reschedule an appointment. We would like to take this opportunity to remind you of our policy stating patients who miss THREE appointments without cancelling or rescheduling 24 hours in advance of the appointment may be subject to cancellation of any further visits with our clinic. Please call [phone] to reschedule your appointment. If there are reasons that make it difficult for you to keep the appointments, please call and let us know how we can help. Please understand that medication prescribing will not continue without seeing your provider.        MEDICATION ISSUES:  INSPECT reviewed as expected    Discussed medication options and treatment plan of prescribed medication as well as the risks, benefits, and side effects including potential falls, possible impaired driving and metabolic adversities among others. Patient is agreeable to call the office with any worsening of symptoms or onset of side effects. Patient is agreeable to call 911 or go to the nearest ER should he/she begin having  SI/HI. No medication side effects or related complaints today.     MEDS ORDERED DURING VISIT:  No orders of the defined types were placed in this encounter.      Return in about 1 week (around 2/18/2025).         This document has been electronically signed by LOBO Vance  February 11, 2025 10:34 EST    Part of this note may be an electronic transcription/translation of spoken language to printed text using the Dragon Dictation System.     Some of the data in this electronic note has been brought forward from a previous encounter, any necessary changes have been made, it has been reviewed by this APRN, and it is accurate.

## 2025-02-11 ENCOUNTER — OFFICE VISIT (OUTPATIENT)
Dept: PSYCHIATRY | Facility: CLINIC | Age: 60
End: 2025-02-11
Payer: OTHER GOVERNMENT

## 2025-02-11 DIAGNOSIS — F33.2 SEVERE EPISODE OF RECURRENT MAJOR DEPRESSIVE DISORDER, WITHOUT PSYCHOTIC FEATURES: Primary | Chronic | ICD-10-CM

## 2025-02-11 NOTE — PROGRESS NOTES
Spravato Monitoring Note    Start time of observation: 805  BP check prior to administration: 103/64  O2-98    Rajendra Haydenangelessaji   presented 02/11/2025  for clinical monitoring of self administration of Spravato. The patient used a total of 3 devices, self-administered intranasally, with a 5-minute rest between each device.    Each device was checked by [facility] for appropriate expiration and that 2 green indicator dots were present prior to patient administration. Afterwards, the device was checked by JL to ensure the green indicator dots were gone and the full medication was delivered.    Patient check at 845 with BP reading of 104/67  O2-98  Patient check at 1005 with BP reading of 107/69  O2-98    End time of observation: 1005    Patient monitored for 2 hours Patient tolerated the procedure well without complications..       NDC 86173-567-03  LOT [id number]  EXP 05/31/2027

## 2025-02-17 ENCOUNTER — OFFICE VISIT (OUTPATIENT)
Dept: PSYCHIATRY | Facility: CLINIC | Age: 60
End: 2025-02-17
Payer: OTHER GOVERNMENT

## 2025-02-17 DIAGNOSIS — F33.2 SEVERE EPISODE OF RECURRENT MAJOR DEPRESSIVE DISORDER, WITHOUT PSYCHOTIC FEATURES: Primary | Chronic | ICD-10-CM

## 2025-02-17 PROCEDURE — G2083 VISIT ESKETAMINE, > 56M: HCPCS

## 2025-02-17 NOTE — PROGRESS NOTES
Spravato Monitoring Note    Start time of observation: 802  BP check prior to administration: 107/68  O2-100    Rajendra David   presented 02/17/2025  for clinical monitoring of self administration of Spravato. The patient used a total of 3 devices, self-administered intranasally, with a 5-minute rest between each device.    Each device was checked by [facility] for appropriate expiration and that 2 green indicator dots were present prior to patient administration. Afterwards, the device was checked by JL to ensure the green indicator dots were gone and the full medication was delivered.    Patient check at 842 with BP reading of 111/70  O2-98  Patient check at 1002 with BP reading of 114/73  O2-97    End time of observation: 1002    Patient monitored for 2 hours Patient tolerated the procedure well without complications..       NDC 58076-234-52  LOT [id number]  EXP 05/31/2027

## 2025-02-17 NOTE — PROGRESS NOTES
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           UNEXPECTED ng/mg creat     Carboxy-THC is a metabolite of tetrahydrocannabinol (THC). Source     of THC is most commonly herbal marijuana or marijuana-based     products, but THC is also pr                           esent in a scheduled prescription     medication. Trace amounts of THC can be present in hemp and     cannabidiol (CBD) products. This test is not intended to     distinguish between delta-9-tetrahydrocannabinol, the predominant     form of THC in most herbal or marijuana-based products, and     delta-8-tetrahydrocannabinol.  ====================================================================  Test                      Result    Flag   Units      Ref Range    Creatinine              109              mg/dL      >=20  ====================================================================  Declared Medications:   The flagging and interpretation on this report are based on the   following declared medications.  Unexpected results may arise from   inaccuracies in the declared medications.   **Note: The testing scope of this panel includes these  medications:   Trazodone   Venlafaxine   **Note: The testing scope of this panel does not include following   reported medications:   Albuterol   Asp                           irin (Aspirin 81)   Bictegravir (Biktarvy)   Buspirone   Cholecalciferol   Empagliflozin   Emtricitabine (Biktarvy)   Flunisolide   Loratadine   Melatonin   Metoprolol   Naloxone   Nicotine   Omeprazole   Rosuvastatin   Sacubitril (Entresto)   Spironolactone   Sumatriptan   Tenofovir (Biktarvy)   Topical Diclofenac   Valsartan (Entresto)   Vitamin B12  ====================================================================  For clinical consultation, please call [phone].  ====================================================================      CREATININE 10/21/2024 109  mg/dL Final    REFERENCE RANGE: Ref Range>=20    Amphetamines, IA 10/21/2024 Comment  CUTOFF:300 ng/mL Final    Further testing indicated    Benzodiazepines 10/21/2024 Negative   Final    Diazepam Urine, Qualitative 10/21/2024 Not Detected  ng/mg creat Final    Desmethyldiazepam 10/21/2024 Not Detected  ng/mg creat Final    Oxazepam, urine 10/21/2024 Not Detected  ng/mg creat Final    Temazepam 10/21/2024 Not Detected  ng/mg creat Final    Comment: Expected metabolism of benzodiazepine class drugs:   Parent Drug       Detected Metabolites   -----------       --------------------   Diazepam:         Desmethyldiazepam, Temazepam, Oxazepam   Chlordiazepoxide: Desmethyldiazepam, Oxazepam   Clorazepate:      Desmethyldiazepam, Oxazepam   Halazepam:        Desmethyldiazepam, Oxazepam   Temazepam:        Oxazepam   Oxazepam:         None      Alprazolam Urine, Conf 10/21/2024 Not Detected  ng/mg creat Final    Alpha-hydroxyalprazolam, Urine 10/21/2024 Not Detected  ng/mg creat Final    Desalkylflurazepam, Urine 10/21/2024 Not Detected  ng/mg creat Final    Lorazepam, Urine 10/21/2024 Not Detected  ng/mg creat Final    Alpha-hydroxytriazolam, Urine 10/21/2024 Not Detected  ng/mg  creat Final    Clonazepam 10/21/2024 Not Detected  ng/mg creat Final    7- AMINOCLONAZEPAM 10/21/2024 Not Detected  ng/mg creat Final    Midazolam, Urine 10/21/2024 Not Detected  ng/mg creat Final    Alpha-hydroxymidazolam, Urine 10/21/2024 Not Detected  ng/mg creat Final    Flunitrazepam 10/21/2024 Not Detected  ng/mg creat Final    DESMETHYLFLUNITRAZEPAM 10/21/2024 Not Detected  ng/mg creat Final    COCAINE / METABOLITE, IA 10/21/2024 Negative  CUTOFF:150 ng/mL Final    Ethanol and Ethanol Biomarkers 10/21/2024 Negative  CUTOFF:500 ng/mL Final    Cannavinoids IA 10/21/2024 Comment  CUTOFF:20 ng/mL Final    Further testing indicated    6-Acetylmorphine IA 10/21/2024 Negative  CUTOFF:10 ng/mL Final    Opiate Class IA 10/21/2024 Negative  CUTOFF:100 ng/mL Final    Oxycodone Class IA 10/21/2024 Negative  CUTOFF:100 ng/mL Final    METHADONE, IA 10/21/2024 Negative  CUTOFF:100 ng/mL Final    Methadone MTB IA 10/21/2024 Negative  CUTOFF:100 ng/mL Final    BUPRENORPHINE IA 10/21/2024 Negative  CUTOFF:5.0 ng/mL Final    FENTANYL, IA 10/21/2024 Negative  CUTOFF:2.0 ng/mL Final    Tapentadol, IA 10/21/2024 Negative  CUTOFF:200 ng/mL Final    PROPOXYPHENE, IA 10/21/2024 Negative  CUTOFF:300 ng/mL Final    TRAMADOL, IA 10/21/2024 Negative  CUTOFF:200 ng/mL Final    METHYLPHENIDATE IA 10/21/2024 Negative  CUTOFF:100 ng/mL Final    Barbiturates, IA 10/21/2024 Negative  CUTOFF:200 ng/mL Final    PHENCYCLIDINE, IA 10/21/2024 Negative  CUTOFF:25 ng/mL Final    ANTICONVULSANTS 10/21/2024 Negative   Final    Pregabalin 10/21/2024 Not Detected   Final    Gabapentin, IA 10/21/2024 Negative  CUTOFF:1.0 ug/mL Final    Carisoprodol, IA 10/21/2024 Negative  CUTOFF:100 ng/mL Final    Antidepressants 10/21/2024 +POSITIVE+   Final    Amitriptyline 10/21/2024 Not Detected   Final    Amoxapine 10/21/2024 Not Detected   Final    8-Hydroxyamoxapine, Ur 10/21/2024 Not Detected   Final    Bupropion, Ur 10/21/2024 Not Detected   Final     Hydroxybupropion 10/21/2024 Not Detected   Final    Citalopram 10/21/2024 Not Detected   Final    Desmethylcitalopram 10/21/2024 Not Detected   Final    Clomipramine, Ur 10/21/2024 Not Detected   Final    Desmethylclomipramine 10/21/2024 Not Detected   Final    Desipramine 10/21/2024 Not Detected   Final    Doxepin 10/21/2024 Not Detected   Final    Desmethyldoxepin, Ur 10/21/2024 Not Detected   Final    Duloxetine, Ur 10/21/2024 Not Detected   Final    Fluoxetine, Ur 10/21/2024 Not Detected   Final    Norfluoxetine, Ur 10/21/2024 Not Detected   Final    Fluvoxamine 10/21/2024 Not Detected   Final    Imipramine 10/21/2024 Not Detected   Final    Mirtazapine 10/21/2024 Not Detected   Final    Nortriptyline 10/21/2024 Not Detected   Final    Paroxetine 10/21/2024 Not Detected   Final    Protriptyline 10/21/2024 Not Detected   Final    Sertraline, Ur 10/21/2024 Not Detected   Final    Desmethylsertraline 10/21/2024 Not Detected   Final    Maprotiline 10/21/2024 Not Detected   Final    Nefazodone, Ur 10/21/2024 Not Detected   Final    Trazodone 10/21/2024 PRESENT   Final    1,3 chlorophenyl piperazine 10/21/2024 PRESENT   Final    Trimipramine 10/21/2024 Not Detected   Final    Venlafaxine 10/21/2024 PRESENT   Final    Desmethylvenlafaxine, Ur 10/21/2024 PRESENT   Final    Vilazodone, Ur 10/21/2024 Not Detected   Final    Antipsychotics, Ur 10/21/2024 Negative   Final    Chlorpromazine 10/21/2024 Not Detected   Final    Clozapine, Ur 10/21/2024 Not Detected   Final    Desmethylclozapine, Ur 10/21/2024 Not Detected   Final    Loxapine, Ur 10/21/2024 Not Detected   Final    8-Hydroxyloxapine 10/21/2024 Not Detected   Final    Mesoridazine 10/21/2024 Not Detected   Final    Olanzapine 10/21/2024 Not Detected   Final    Quetiapine 10/21/2024 Not Detected   Final    Risperidone 10/21/2024 Not Detected   Final    Fluphenazine 10/21/2024 Not Detected   Final    Haloperidol 10/21/2024 Not Detected   Final    THIORIDAZINE, UR  10/21/2024 Not Detected   Final    Molindone, Ur 10/21/2024 Not Detected   Final    Pimozide, Ur 10/21/2024 Not Detected   Final    Prochlorperazine, Ur 10/21/2024 Not Detected   Final    Thiothixene 10/21/2024 Not Detected   Final    Trifluoperazine 10/21/2024 Not Detected   Final    Ziprasidone 10/21/2024 Not Detected   Final    Perphenazine, Ur 10/21/2024 Not Detected   Final    Aripiprazole 10/21/2024 Not Detected   Final    Asenapine 10/21/2024 Not Detected   Final    Iloperidone 10/21/2024 Not Detected   Final    Lurasidone 10/21/2024 Not Detected   Final    KRATOM IA 10/21/2024 Negative  CUTOFF:5.0 ng/mL Final    Amphetamines Confirmation 10/21/2024 Negative   Final    METHAMPHETAMINE 10/21/2024 Not Detected  ng/mg creat Final    AMPHETAMINE 10/21/2024 Not Detected  ng/mg creat Final    MDMA-Ecstasy 10/21/2024 Not Detected  ng/mg creat Final    MDA 10/21/2024 Not Detected  ng/mg creat Final    Cannabinoid Confirmation 10/21/2024 +POSITIVE+   Final    Carboxy THC 10/21/2024 95  ng/mg creat Final    Comment: This test is not intended to distinguish between the metabolites of  delta-9-tetrahydrocannabinol, the predominant form of THC in most  herbal or marijuana-based products, and delta-8-tetrahydrocannabinol,  a psychoactive compound generally synthesized from other  cannabinoids.         Assessment & Plan   Diagnoses and all orders for this visit:    1. Severe episode of recurrent major depressive disorder, without psychotic features (Primary)      Assessment & Plan  1. Major depressive disorder    Patient is moving tomorrow to [place]. He states he did call, and [facility]  [place] He also has found a Spravato provider in the area, and plans to contact the new provider today to confirm their acceptance of his [facility] insurance.     Advised the patient, if the new [facility] provider was going to be several months getting him in to their clinic, we could  potentially see him here still, monthly, but he would have to make us aware if he needs to do that, so we can book him an appointment and order his medication. Patient expressed understanding. He denies any active suicidal thoughts with any plan or intent.       Continue Spravato 84mg. Patient will be transferring to provider in [place]. Patient may continue to travel here for monthly appointments, if new provider has a long wait to get him in for Spravato there. Patient will call the office and let us know if he intends to do this, so appointment can be made and medication ordered.     Patient will continue Effexor 75mg daily, buspirone 7.5mg daily, trazodone 100mg nightly, with [facility].         Visit Diagnoses:    ICD-10-CM ICD-9-CM   1. Severe episode of recurrent major depressive disorder, without psychotic features  F33.2 296.33             Pt history, review of systems, medications, allergies, reviewed, patient was screened today for depression/anxiety, PHQ/SIXTO scores reviewed.  Most recent vitals/labs reviewed.  Pt was given appropriate time to ask questions and concerns were addressed. A thorough discussion was had that included review of disease process, need for continued monitoring and additional treatment options including use of pharmacological and non-pharmacological approaches to care, decisions were made and agreed upon by patient and provider.   Discussed the risks, benefits, and potential side effects of the medications; patient ackowledged and verbally consented.     TREATMENT PLAN/GOALS: Continue supportive psychotherapy efforts and medications as indicated. Treatment and medication options discussed during today's visit. Patient ackowledged and verbally consented to continue with current treatment plan and was educated on the importance of compliance with treatment and follow-up appointments.    -Short-Term Goals: Patient will be compliant with medication management and note  improvement in S/S over the next 4 to 6 weeks or at next scheduled visit.  -Long-Term Goals: Patient will be compliant with the agreed treatment plan including medication regimen & F/U appt's and deny impairment in daily functioning over the next 6 months.      CRISIS RESOURCES:    In the event you have personal crisis, there are several resources to reach someone to talk with:    985 Suicide and Crisis Lifeline  Call or text 984 or chat [employer]  [facility]'s National Helpline  7-254-161-HELP (4357)  Text your zip code to [phone] (HELP4U)  [place]'s Crisis Line  Dial 988, then press 1  Text 751740    No show policy:  We understand unexpected circumstances arise; however, anytime you miss your appointment we are unable to provide you appropriate care.  In addition, each appointment missed could have been used to provide care for others.  We ask that you call at least 24 hours in advance to cancel or reschedule an appointment. We would like to take this opportunity to remind you of our policy stating patients who miss THREE appointments without cancelling or rescheduling 24 hours in advance of the appointment may be subject to cancellation of any further visits with our clinic. Please call [phone] to reschedule your appointment. If there are reasons that make it difficult for you to keep the appointments, please call and let us know how we can help. Please understand that medication prescribing will not continue without seeing your provider.        MEDICATION ISSUES:  INSPECT reviewed as expected    Discussed medication options and treatment plan of prescribed medication as well as the risks, benefits, and side effects including potential falls, possible impaired driving and metabolic adversities among others. Patient is agreeable to call the office with any worsening of symptoms or onset of side effects. Patient is agreeable to call 911 or go to the nearest ER should he/she begin having SI/HI. No medication  side effects or related complaints today.     MEDS ORDERED DURING VISIT:  No orders of the defined types were placed in this encounter.      No follow-ups on file.         This document has been electronically signed by LOBO Vance  February 17, 2025 08:04 EST    Part of this note may be an electronic transcription/translation of spoken language to printed text using the Dragon Dictation System.     Some of the data in this electronic note has been brought forward from a previous encounter, any necessary changes have been made, it has been reviewed by this APRN, and it is accurate.